# Patient Record
Sex: MALE | Race: ASIAN | NOT HISPANIC OR LATINO | Employment: FULL TIME | URBAN - METROPOLITAN AREA
[De-identification: names, ages, dates, MRNs, and addresses within clinical notes are randomized per-mention and may not be internally consistent; named-entity substitution may affect disease eponyms.]

---

## 2018-01-08 ENCOUNTER — OFFICE VISIT CONVERTED (OUTPATIENT)
Dept: CARDIOLOGY | Facility: CLINIC | Age: 63
End: 2018-01-08
Attending: INTERNAL MEDICINE

## 2018-07-31 ENCOUNTER — OFFICE VISIT CONVERTED (OUTPATIENT)
Dept: CARDIOLOGY | Facility: CLINIC | Age: 63
End: 2018-07-31
Attending: INTERNAL MEDICINE

## 2019-01-15 ENCOUNTER — HOSPITAL ENCOUNTER (OUTPATIENT)
Dept: LAB | Facility: HOSPITAL | Age: 64
Discharge: HOME | End: 2019-01-15
Attending: INTERNAL MEDICINE

## 2019-01-15 LAB
ALBUMIN SERPL-MCNC: 4.6 G/DL (ref 3.5–5)
ALBUMIN/GLOB SERPL: 1.4 {RATIO} (ref 1.4–2.6)
ALP SERPL-CCNC: 58 U/L (ref 56–155)
ALT SERPL-CCNC: 19 U/L (ref 10–40)
ANION GAP SERPL CALC-SCNC: 15 MMOL/L (ref 8–19)
AST SERPL-CCNC: 21 U/L (ref 15–50)
BILIRUB SERPL-MCNC: 0.66 MG/DL (ref 0.2–1.3)
BUN SERPL-MCNC: 17 MG/DL (ref 5–25)
BUN/CREAT SERPL: 17 {RATIO} (ref 6–20)
CALCIUM SERPL-MCNC: 9.3 MG/DL (ref 8.7–10.4)
CHLORIDE SERPL-SCNC: 99 MMOL/L (ref 99–111)
CHOLEST SERPL-MCNC: 132 MG/DL (ref 107–200)
CHOLEST/HDLC SERPL: 2.8 {RATIO} (ref 3–6)
CONV CO2: 29 MMOL/L (ref 22–32)
CONV TOTAL PROTEIN: 7.8 G/DL (ref 6.3–8.2)
CREAT UR-MCNC: 1.01 MG/DL (ref 0.7–1.2)
GFR SERPLBLD BASED ON 1.73 SQ M-ARVRAT: >60 ML/MIN/{1.73_M2}
GLOBULIN UR ELPH-MCNC: 3.2 G/DL (ref 2–3.5)
GLUCOSE SERPL-MCNC: 121 MG/DL (ref 70–99)
HDLC SERPL-MCNC: 47 MG/DL (ref 40–60)
LDLC SERPL CALC-MCNC: 72 MG/DL (ref 70–100)
OSMOLALITY SERPL CALC.SUM OF ELEC: 291 MOSM/KG (ref 273–304)
POTASSIUM SERPL-SCNC: 4.4 MMOL/L (ref 3.5–5.3)
SODIUM SERPL-SCNC: 139 MMOL/L (ref 135–147)
TRIGL SERPL-MCNC: 63 MG/DL (ref 40–150)
VLDLC SERPL-MCNC: 13 MG/DL (ref 5–37)

## 2019-02-04 ENCOUNTER — OFFICE VISIT CONVERTED (OUTPATIENT)
Dept: CARDIOLOGY | Facility: CLINIC | Age: 64
End: 2019-02-04
Attending: INTERNAL MEDICINE

## 2019-07-30 ENCOUNTER — HOSPITAL ENCOUNTER (OUTPATIENT)
Dept: LAB | Facility: HOSPITAL | Age: 64
Discharge: HOME | End: 2019-07-30
Attending: INTERNAL MEDICINE

## 2019-07-30 LAB
ALBUMIN SERPL-MCNC: 4.3 G/DL (ref 3.5–5)
ALBUMIN/GLOB SERPL: 1.5 {RATIO} (ref 1.4–2.6)
ALP SERPL-CCNC: 58 U/L (ref 56–155)
ALT SERPL-CCNC: 23 U/L (ref 10–40)
ANION GAP SERPL CALC-SCNC: 18 MMOL/L (ref 8–19)
AST SERPL-CCNC: 26 U/L (ref 15–50)
BILIRUB SERPL-MCNC: 0.62 MG/DL (ref 0.2–1.3)
BUN SERPL-MCNC: 16 MG/DL (ref 5–25)
BUN/CREAT SERPL: 16 {RATIO} (ref 6–20)
CALCIUM SERPL-MCNC: 9.4 MG/DL (ref 8.7–10.4)
CHLORIDE SERPL-SCNC: 103 MMOL/L (ref 99–111)
CHOLEST SERPL-MCNC: 109 MG/DL (ref 107–200)
CHOLEST/HDLC SERPL: 2.4 {RATIO} (ref 3–6)
CONV CO2: 26 MMOL/L (ref 22–32)
CONV TOTAL PROTEIN: 7.1 G/DL (ref 6.3–8.2)
CREAT UR-MCNC: 0.97 MG/DL (ref 0.7–1.2)
GFR SERPLBLD BASED ON 1.73 SQ M-ARVRAT: >60 ML/MIN/{1.73_M2}
GLOBULIN UR ELPH-MCNC: 2.8 G/DL (ref 2–3.5)
GLUCOSE SERPL-MCNC: 121 MG/DL (ref 70–99)
HDLC SERPL-MCNC: 45 MG/DL (ref 40–60)
LDLC SERPL CALC-MCNC: 51 MG/DL (ref 70–100)
OSMOLALITY SERPL CALC.SUM OF ELEC: 298 MOSM/KG (ref 273–304)
POTASSIUM SERPL-SCNC: 4.4 MMOL/L (ref 3.5–5.3)
SODIUM SERPL-SCNC: 143 MMOL/L (ref 135–147)
TRIGL SERPL-MCNC: 67 MG/DL (ref 40–150)
VLDLC SERPL-MCNC: 13 MG/DL (ref 5–37)

## 2019-08-05 ENCOUNTER — OFFICE VISIT CONVERTED (OUTPATIENT)
Dept: CARDIOLOGY | Facility: CLINIC | Age: 64
End: 2019-08-05
Attending: INTERNAL MEDICINE

## 2020-01-29 ENCOUNTER — HOSPITAL ENCOUNTER (OUTPATIENT)
Dept: LAB | Facility: HOSPITAL | Age: 65
Discharge: HOME | End: 2020-01-29
Attending: INTERNAL MEDICINE

## 2020-01-29 LAB
ALBUMIN SERPL-MCNC: 4.7 G/DL (ref 3.5–5)
ALBUMIN/GLOB SERPL: 1.6 {RATIO} (ref 1.4–2.6)
ALP SERPL-CCNC: 50 U/L (ref 56–155)
ALT SERPL-CCNC: 19 U/L (ref 10–40)
ANION GAP SERPL CALC-SCNC: 23 MMOL/L (ref 8–19)
AST SERPL-CCNC: 21 U/L (ref 15–50)
BILIRUB SERPL-MCNC: 0.67 MG/DL (ref 0.2–1.3)
BUN SERPL-MCNC: 18 MG/DL (ref 5–25)
BUN/CREAT SERPL: 19 {RATIO} (ref 6–20)
CALCIUM SERPL-MCNC: 9.7 MG/DL (ref 8.7–10.4)
CHLORIDE SERPL-SCNC: 102 MMOL/L (ref 99–111)
CHOLEST SERPL-MCNC: 137 MG/DL (ref 107–200)
CHOLEST/HDLC SERPL: 2.9 {RATIO} (ref 3–6)
CONV CO2: 23 MMOL/L (ref 22–32)
CONV TOTAL PROTEIN: 7.7 G/DL (ref 6.3–8.2)
CREAT UR-MCNC: 0.96 MG/DL (ref 0.7–1.2)
GFR SERPLBLD BASED ON 1.73 SQ M-ARVRAT: >60 ML/MIN/{1.73_M2}
GLOBULIN UR ELPH-MCNC: 3 G/DL (ref 2–3.5)
GLUCOSE SERPL-MCNC: 122 MG/DL (ref 70–99)
HDLC SERPL-MCNC: 48 MG/DL (ref 40–60)
LDLC SERPL CALC-MCNC: 77 MG/DL (ref 70–100)
OSMOLALITY SERPL CALC.SUM OF ELEC: 299 MOSM/KG (ref 273–304)
POTASSIUM SERPL-SCNC: 4.7 MMOL/L (ref 3.5–5.3)
SODIUM SERPL-SCNC: 143 MMOL/L (ref 135–147)
TRIGL SERPL-MCNC: 62 MG/DL (ref 40–150)
VLDLC SERPL-MCNC: 12 MG/DL (ref 5–37)

## 2020-02-10 ENCOUNTER — CONVERSION ENCOUNTER (OUTPATIENT)
Dept: CARDIOLOGY | Facility: CLINIC | Age: 65
End: 2020-02-10

## 2020-02-10 ENCOUNTER — OFFICE VISIT CONVERTED (OUTPATIENT)
Dept: CARDIOLOGY | Facility: CLINIC | Age: 65
End: 2020-02-10
Attending: INTERNAL MEDICINE

## 2020-08-10 ENCOUNTER — CONVERSION ENCOUNTER (OUTPATIENT)
Dept: CARDIOLOGY | Facility: CLINIC | Age: 65
End: 2020-08-10

## 2020-08-10 ENCOUNTER — OFFICE VISIT CONVERTED (OUTPATIENT)
Dept: CARDIOLOGY | Facility: CLINIC | Age: 65
End: 2020-08-10
Attending: INTERNAL MEDICINE

## 2020-08-31 ENCOUNTER — HOSPITAL ENCOUNTER (OUTPATIENT)
Dept: LAB | Facility: HOSPITAL | Age: 65
Discharge: HOME | End: 2020-08-31
Attending: INTERNAL MEDICINE

## 2020-08-31 LAB
ALBUMIN SERPL-MCNC: 4.4 G/DL (ref 3.5–5)
ALBUMIN/GLOB SERPL: 1.7 {RATIO} (ref 1.4–2.6)
ALP SERPL-CCNC: 58 U/L (ref 56–155)
ALT SERPL-CCNC: 20 U/L (ref 10–40)
ANION GAP SERPL CALC-SCNC: 17 MMOL/L (ref 8–19)
AST SERPL-CCNC: 28 U/L (ref 15–50)
BILIRUB SERPL-MCNC: 0.76 MG/DL (ref 0.2–1.3)
BUN SERPL-MCNC: 21 MG/DL (ref 5–25)
BUN/CREAT SERPL: 18 {RATIO} (ref 6–20)
CALCIUM SERPL-MCNC: 9.9 MG/DL (ref 8.7–10.4)
CHLORIDE SERPL-SCNC: 103 MMOL/L (ref 99–111)
CHOLEST SERPL-MCNC: 147 MG/DL (ref 107–200)
CHOLEST/HDLC SERPL: 3.2 {RATIO} (ref 3–6)
CONV CO2: 25 MMOL/L (ref 22–32)
CONV TOTAL PROTEIN: 7 G/DL (ref 6.3–8.2)
CREAT UR-MCNC: 1.14 MG/DL (ref 0.7–1.2)
GFR SERPLBLD BASED ON 1.73 SQ M-ARVRAT: >60 ML/MIN/{1.73_M2}
GLOBULIN UR ELPH-MCNC: 2.6 G/DL (ref 2–3.5)
GLUCOSE SERPL-MCNC: 124 MG/DL (ref 70–99)
HDLC SERPL-MCNC: 46 MG/DL (ref 40–60)
LDLC SERPL CALC-MCNC: 84 MG/DL (ref 70–100)
OSMOLALITY SERPL CALC.SUM OF ELEC: 294 MOSM/KG (ref 273–304)
POTASSIUM SERPL-SCNC: 4.9 MMOL/L (ref 3.5–5.3)
SODIUM SERPL-SCNC: 140 MMOL/L (ref 135–147)
TRIGL SERPL-MCNC: 84 MG/DL (ref 40–150)
VLDLC SERPL-MCNC: 17 MG/DL (ref 5–37)

## 2020-12-02 ENCOUNTER — HOSPITAL ENCOUNTER (OUTPATIENT)
Dept: LAB | Facility: HOSPITAL | Age: 65
Discharge: HOME | End: 2020-12-02
Attending: NURSE PRACTITIONER

## 2020-12-02 LAB
ALBUMIN SERPL-MCNC: 4.3 G/DL (ref 3.5–5)
ALBUMIN/GLOB SERPL: 1.6 {RATIO} (ref 1.4–2.6)
ALP SERPL-CCNC: 61 U/L (ref 56–155)
ALT SERPL-CCNC: 23 U/L (ref 10–40)
ANION GAP SERPL CALC-SCNC: 14 MMOL/L (ref 8–19)
AST SERPL-CCNC: 25 U/L (ref 15–50)
BILIRUB SERPL-MCNC: 0.69 MG/DL (ref 0.2–1.3)
BUN SERPL-MCNC: 19 MG/DL (ref 5–25)
BUN/CREAT SERPL: 18 {RATIO} (ref 6–20)
CALCIUM SERPL-MCNC: 9.3 MG/DL (ref 8.7–10.4)
CHLORIDE SERPL-SCNC: 103 MMOL/L (ref 99–111)
CHOLEST SERPL-MCNC: 119 MG/DL (ref 107–200)
CHOLEST/HDLC SERPL: 2.5 {RATIO} (ref 3–6)
CONV CO2: 26 MMOL/L (ref 22–32)
CONV TOTAL PROTEIN: 7 G/DL (ref 6.3–8.2)
CREAT UR-MCNC: 1.05 MG/DL (ref 0.7–1.2)
GFR SERPLBLD BASED ON 1.73 SQ M-ARVRAT: >60 ML/MIN/{1.73_M2}
GLOBULIN UR ELPH-MCNC: 2.7 G/DL (ref 2–3.5)
GLUCOSE SERPL-MCNC: 117 MG/DL (ref 70–99)
HDLC SERPL-MCNC: 47 MG/DL (ref 40–60)
LDLC SERPL CALC-MCNC: 57 MG/DL (ref 70–100)
OSMOLALITY SERPL CALC.SUM OF ELEC: 289 MOSM/KG (ref 273–304)
POTASSIUM SERPL-SCNC: 4.9 MMOL/L (ref 3.5–5.3)
SODIUM SERPL-SCNC: 138 MMOL/L (ref 135–147)
TRIGL SERPL-MCNC: 73 MG/DL (ref 40–150)
VLDLC SERPL-MCNC: 15 MG/DL (ref 5–37)

## 2021-05-10 NOTE — PROCEDURES
"   Procedure Note      Patient Name: Myles Gann   Patient ID: [id number]   Sex: Male   YOB: 1955    Primary Care Provider: Raina OBANDO   Referring Provider: Raina OBANDO    Visit Date: August 10, 2020    Provider: Everett Gallagher MD   Location: [facility]   Location Address: [address] [place] [address], [address]   [place]  [id number]   Location Phone: [phone]          FINAL REPORT   TRANSTHORACIC ECHOCARDIOGRAM REPORT    Diagnosis: Coronary artery disease.   Height: 5'8\" Weight: 192 B/P: 144/76 BSA: 2.0   Tech: BNS   MEASUREMENTS:  RVID (Diastole) : RVID. (NORMAL: 0.7 to 2.4 cm max)   LVID (Systole): 2.7 cm (Diastole): 4.8 cm . (NORMAL: 3.7 - 5.4 cm)   Posterior Wall Thickness (Diastole): 0.8 cm. (NORMAL: 0.8 - 1.1 cm)   Septal Thickness (Diastole): 1.0 cm. (NORMAL: 0.7 - 1.2 cm)   LAID (Systole): 3.3 cm. (NORMAL: 1.9 - 3.8 cm)   Aortic Root Diameter (Diastole): 3.1 cm. (NORMAL: 2.0 - 3.7 cm)   COMMENTS:  The patient underwent 2-D, M-Mode, and Doppler examination, including pulse-wave, continuous-wave, and color-flow Doppler analysis; the study is technically adequate. The following findings were noted:   FINDINGS:  MITRAL VALVE: Normal in appearance, opens well. No evidence of mitral valve prolapse. No mitral stenosis. There is mild mitral regurgitation.   AORTIC VALVE: Normal trileaflet aortic valve, opens well. No evidence of aortic stenosis or regurgitation on Doppler examination.   TRICUSPID VALVE: Normal in appearance, opens well. Trace tricuspid regurgitation. Normal pulmonary artery systolic pressure.   PULMONIC VALVE: Grossly normal.   AORTIC ROOT: Normal in size with adequate motion.   LEFT ATRIUM: Mild left atrial enlargement. No intracavitary masses or clots seen. LA volume index is 38 mL/m2.   LEFT VENTRICLE: The left ventricular chamber size is normal. The left ventricular wall thickness is normal. Left ventricular systolic function is normal. " Estimated LV ejection fraction is 55%. There are no regional wall motion abnormalities.   RIGHT VENTRICLE: Normal size and function.   RIGHT ATRIUM: Normal in size.   PERICARDIUM: No evidence of pericardial effusion.   INFERIOR VENA CAVA: Measures 1.7 cm in diameter and there is more than 50% collapse during inspiration.   DOPPLER: E/A ratio is 1.8. DT= 237 msec. IVRT is 84 msec. E/e' is 9.   Faxed: 08/10/2020      CONCLUSION:  1.  Normal left ventricular systolic function with estimated LV ejection fraction of 55%.   2.  Mild mitral regurgitation.       MD ERROL Andrade/jose    This note was transcribed by Kelsea Brown.  jose/errol  The above service was transcribed by Kelsea Brown, and I attest to the accuracy of the note.  TORINV                 Electronically Signed by: Марина Brown-, Other -Author on August 10, 2020 02:32:59 PM  Electronically Co-signed by: Everett Gallagher MD -Reviewer on August 11, 2020 10:04:31 AM

## 2021-05-13 NOTE — PROGRESS NOTES
Progress Note      Patient Name: Myles Gann   Patient ID: [id number]   Sex: Male   YOB: 1955    Primary Care Provider: Raina OBANDO   Referring Provider: Raina OBANDO    Visit Date: August 10, 2020    Provider: Everett Gallagher MD   Location: [facility]   Location Address: [address] [place] [address], [facility]   [place]  [id number]   Location Phone: [phone]          Chief Complaint  · Follow-up visit for coronary artery disease, hyperlipidemia.      History Of Present Illness  REFERRING CARE PROVIDER: Raina OBANDO   Myles Gann is a 65-year-old male with premature coronary artery disease with previous angioplasty, hypertension, and hyperlipidemia who is here for a routine six-month follow-up visit. He denies having any chest pain, shortness of breath, or palpitations. Overall, he is feeling fine.   PAST MEDICAL HISTORY: (1) Premature coronary artery disease. Cardiac catheterization on 11/08/2004 at [facility] in [place], [place], with 90% stenosis involving the proximal left anterior descending artery in the setting of acute coronary syndrome. He underwent 3.0 Cypher stent placement. Other arteries were normal at that point. (2) Preserved left ventricular function and last echocardiogram in 2008 showed LV ejection fraction of 60%. (3) Hypertension. (4) Hyperlipidemia. (5) Negative for coronary artery disease.   PSYCHOSOCIAL HISTORY: No history of mood change or depression. He drinks a moderate amount of alcohol. He does not use tobacco.   CURRENT MEDICATIONS: include Lisinopril 20 mg daily; Metoprolol  mg daily; aspirin 81 mg daily. The dosage and frequency of the medications were reviewed with the patient.       Review of Systems  · Cardiovascular  o Denies  o : palpitations (fast, fluttering, or skipping beats), swelling (feet, ankles, hands), shortness of breath while walking or lying flat, chest pain or angina pectoris  "  · Respiratory  o Denies  o : chronic or frequent cough, asthma or wheezing      Vitals  Date Time BP Position Site L\R Cuff Size HR RR TEMP (F) WT  HT  BMI kg/m2 BSA m2 O2 Sat HC       08/10/2020 10:30 /76 Sitting    58 - R   185lbs 16oz 5'  8\" 28.28 2.01           Physical Examination  · Respiratory  o Auscultation of Lungs  o : Clear to auscultation bilaterally. No crackles or rhonchi.  · Cardiovascular  o Heart  o : S1, S2 is normally heard. No S3. No murmur, rubs, or gallops.  · Gastrointestinal  o Abdominal Examination  o : Soft, nontender, nondistended. No free fluid. Bowel sounds heard in all four quadrants.  · Extremities  o Extremities  o : Warm and well perfused. No pitting pedal edema. Distal pulses present.  · Labs  o Labs  o : No recent labs available for review.      Echocardiogram done in the office today showed LV ejection fraction of 55%, and there is mild mitral regurgitation.           Assessment     ASSESSMENT AND PLAN:  1.  Coronary artery disease.  Remote angioplasty, stable with no angina.  Preserved LV function on today's        echocardiogram.  Continue aspirin, statin, and beta-blocker at the current dose.   2.  Hypertension, well controlled.  Continue current regimen.   3.  Hyperlipidemia.  Will check lipid panel now and adjust the dose as needed.  Continue Crestor for now.   4.  Follow up in six months.      MD ERROL Andrade/jose    This note was transcribed by Kelsea Brown.  jose/errol  The above service was transcribed by Kelsea Brown, and I attest to the accuracy of the note.  ERROL             Electronically Signed by: Марина Brown-, Other -Author on August 10, 2020 02:43:47 PM  Electronically Co-signed by: Everett Gallagher MD -Reviewer on August 11, 2020 10:04:23 AM  "

## 2021-05-15 VITALS
DIASTOLIC BLOOD PRESSURE: 76 MMHG | SYSTOLIC BLOOD PRESSURE: 122 MMHG | WEIGHT: 186 LBS | HEIGHT: 68 IN | BODY MASS INDEX: 28.19 KG/M2 | HEART RATE: 58 BPM

## 2021-05-15 VITALS
HEART RATE: 60 BPM | SYSTOLIC BLOOD PRESSURE: 140 MMHG | DIASTOLIC BLOOD PRESSURE: 76 MMHG | BODY MASS INDEX: 28.34 KG/M2 | HEIGHT: 68 IN | WEIGHT: 187 LBS

## 2021-05-15 VITALS
HEIGHT: 68 IN | WEIGHT: 189 LBS | DIASTOLIC BLOOD PRESSURE: 80 MMHG | SYSTOLIC BLOOD PRESSURE: 146 MMHG | HEART RATE: 60 BPM | BODY MASS INDEX: 28.64 KG/M2

## 2021-05-15 VITALS
HEIGHT: 68 IN | HEART RATE: 64 BPM | WEIGHT: 192 LBS | DIASTOLIC BLOOD PRESSURE: 76 MMHG | SYSTOLIC BLOOD PRESSURE: 144 MMHG | BODY MASS INDEX: 29.1 KG/M2

## 2021-05-16 VITALS
HEIGHT: 68 IN | SYSTOLIC BLOOD PRESSURE: 130 MMHG | WEIGHT: 191 LBS | HEART RATE: 78 BPM | BODY MASS INDEX: 28.95 KG/M2 | DIASTOLIC BLOOD PRESSURE: 86 MMHG

## 2021-05-16 VITALS
SYSTOLIC BLOOD PRESSURE: 138 MMHG | WEIGHT: 188 LBS | HEART RATE: 70 BPM | BODY MASS INDEX: 28.49 KG/M2 | DIASTOLIC BLOOD PRESSURE: 80 MMHG | HEIGHT: 68 IN

## 2021-07-12 RX ORDER — METOPROLOL SUCCINATE 100 MG/1
TABLET, EXTENDED RELEASE ORAL
Qty: 90 TABLET | Refills: 0 | Status: SHIPPED | OUTPATIENT
Start: 2021-07-12 | End: 2021-10-11 | Stop reason: SDUPTHER

## 2021-07-12 RX ORDER — LISINOPRIL 20 MG/1
TABLET ORAL
Qty: 90 TABLET | Refills: 0 | Status: SHIPPED | OUTPATIENT
Start: 2021-07-12 | End: 2021-10-11 | Stop reason: SDUPTHER

## 2021-07-18 PROBLEM — I10 HYPERTENSION: Status: ACTIVE | Noted: 2021-07-18

## 2021-07-18 PROBLEM — I21.9 HEART ATTACK: Status: ACTIVE | Noted: 2021-07-18

## 2021-07-18 PROBLEM — E78.5 HYPERLIPIDEMIA: Status: ACTIVE | Noted: 2021-07-18

## 2021-08-31 ENCOUNTER — PREP FOR SURGERY (OUTPATIENT)
Dept: OTHER | Facility: HOSPITAL | Age: 66
End: 2021-08-31

## 2021-08-31 ENCOUNTER — TELEPHONE (OUTPATIENT)
Dept: UROLOGY | Facility: CLINIC | Age: 66
End: 2021-08-31

## 2021-08-31 ENCOUNTER — OFFICE VISIT (OUTPATIENT)
Dept: SURGERY | Facility: CLINIC | Age: 66
End: 2021-08-31

## 2021-08-31 VITALS
WEIGHT: 191.4 LBS | HEIGHT: 68 IN | BODY MASS INDEX: 29.01 KG/M2 | OXYGEN SATURATION: 94 % | RESPIRATION RATE: 18 BRPM | HEART RATE: 73 BPM

## 2021-08-31 DIAGNOSIS — Z12.11 SCREENING FOR MALIGNANT NEOPLASM OF COLON: Primary | ICD-10-CM

## 2021-08-31 PROCEDURE — S0285 CNSLT BEFORE SCREEN COLONOSC: HCPCS | Performed by: NURSE PRACTITIONER

## 2021-08-31 RX ORDER — METOPROLOL SUCCINATE 100 MG/1
100 TABLET, EXTENDED RELEASE ORAL DAILY
COMMUNITY
End: 2021-10-11 | Stop reason: SDUPTHER

## 2021-08-31 RX ORDER — SODIUM CHLORIDE 0.9 % (FLUSH) 0.9 %
10 SYRINGE (ML) INJECTION AS NEEDED
Status: CANCELLED | OUTPATIENT
Start: 2021-08-31

## 2021-08-31 RX ORDER — ROSUVASTATIN CALCIUM 40 MG/1
TABLET, COATED ORAL
COMMUNITY
Start: 2021-07-15 | End: 2021-10-11 | Stop reason: SDUPTHER

## 2021-08-31 RX ORDER — PANTOPRAZOLE SODIUM 40 MG/1
40 TABLET, DELAYED RELEASE ORAL AS NEEDED
COMMUNITY
Start: 2021-07-06

## 2021-08-31 RX ORDER — LISINOPRIL 20 MG/1
20 TABLET ORAL DAILY
COMMUNITY
End: 2021-10-11 | Stop reason: SDUPTHER

## 2021-08-31 RX ORDER — SODIUM CHLORIDE 0.9 % (FLUSH) 0.9 %
3 SYRINGE (ML) INJECTION EVERY 12 HOURS SCHEDULED
Status: CANCELLED | OUTPATIENT
Start: 2021-08-31

## 2021-08-31 RX ORDER — ATORVASTATIN CALCIUM 40 MG/1
40 TABLET, FILM COATED ORAL DAILY
COMMUNITY
End: 2021-10-11 | Stop reason: ALTCHOICE

## 2021-08-31 RX ORDER — ASPIRIN 325 MG
325 TABLET ORAL
COMMUNITY

## 2021-08-31 RX ORDER — ERGOCALCIFEROL 1.25 MG/1
50000 CAPSULE ORAL
COMMUNITY
Start: 2021-07-06

## 2021-08-31 NOTE — PROGRESS NOTES
Chief Complaint:   Establish Care and Colonoscopy (Consult)    Subjective      Time for colonoscopy         History of Present Illness  Myles Gann is a 66 y.o. male presents to [facility] for colonoscopy consultation.    Patient denies any abdominal pain, change in bowel habit, rectal bleeding.    Denies any family history of colorectal cancer.    Patient reports that he had MI with stent placement several years ago and takes  mg daily.  We will have the patient hold this 2 days prior to the procedure.      10/08: colonoscopy ([facility]): small hemorrhoids.       Objective     Past Medical History:   Diagnosis Date   • Heart attack (CMS/HCC)    • Hyperlipidemia    • Hypertension        Past Surgical History:   Procedure Laterality Date   • CARDIAC SURGERY     • OTHER SURGICAL HISTORY      Stent in heart/ at 49 years old   • THROAT SURGERY           Current Outpatient Medications:   •  aspirin 325 MG tablet, Take 325 mg by mouth., Disp: , Rfl:   •  atorvastatin (LIPITOR) 40 MG tablet, Take 40 mg by mouth Daily., Disp: , Rfl:   •  lisinopril (PRINIVIL,ZESTRIL) 20 MG tablet, TAKE 1 TABLET BY MOUTH EVERY DAY, Disp: 90 tablet, Rfl: 0  •  metoprolol succinate XL (TOPROL-XL) 100 MG 24 hr tablet, TAKE 1 TABLET BY MOUTH EVERY DAY, Disp: 90 tablet, Rfl: 0  •  vitamin D (ERGOCALCIFEROL) 1.25 MG (68978 UT) capsule capsule, Take 50,000 Units by mouth Every 7 (Seven) Days.  , Disp: , Rfl:   •  lisinopril (PRINIVIL,ZESTRIL) 20 MG tablet, Take 20 mg by mouth Daily., Disp: , Rfl:   •  metoprolol succinate XL (TOPROL-XL) 100 MG 24 hr tablet, Take 100 mg by mouth Daily., Disp: , Rfl:   •  pantoprazole (PROTONIX) 40 MG EC tablet, Take 40 mg by mouth Daily., Disp: , Rfl:   •  rosuvastatin (CRESTOR) 40 MG tablet, TAKE 1 TABLET BY MOUTH BY MOUTH AT BEDTIME, Disp: , Rfl:     No Known Allergies     Family History   Problem Relation Age of Onset   • Diabetes Mother    • Diabetes Brother    • Heart  "disease Brother        Social History     Socioeconomic History   • Marital status:      Spouse name: Not on file   • Number of children: Not on file   • Years of education: Not on file   • Highest education level: Not on file   Tobacco Use   • Smoking status: Never Smoker   • Smokeless tobacco: Never Used   Vaping Use   • Vaping Use: Never used   Substance and Sexual Activity   • Alcohol use: Yes     Comment: Occasionally   • Drug use: Never   • Sexual activity: Defer       Review of Systems     Vital Signs:   Pulse 73   Resp 18   Ht 172.7 cm (68\")   Wt 86.8 kg (191 lb 6.4 oz)   SpO2 94% Comment: room air  BMI 29.10 kg/m²      Physical Exam  Constitutional:       Appearance: Normal appearance.   Cardiovascular:      Rate and Rhythm: Normal rate.   Pulmonary:      Effort: Pulmonary effort is normal.   Abdominal:      General: Abdomen is flat.      Palpations: Abdomen is soft.   Skin:     General: Skin is warm and dry.   Neurological:      General: No focal deficit present.      Mental Status: He is alert and oriented to person, place, and time.   Psychiatric:         Mood and Affect: Mood normal.         Behavior: Behavior normal.          Result Review :              []  Laboratory  []  Radiology  []  Pathology  []  Microbiology  []  EKG/Telemetry   []  Cardiology/Vascular   [x]  Old records       Assessment and Plan    Diagnoses and all orders for this visit:    1. Screening for malignant neoplasm of colon (Primary)        Follow Up   Return for Schedule colonoscopy with Dr. Villegas on 10/13/21 at [facility].     Hospital call with arrival time    Patient was given instructions and counseling regarding his condition or for health maintenance advice. Please see specific information pulled into the AVS if appropriate.           "

## 2021-09-08 NOTE — TELEPHONE ENCOUNTER
Colonoscopy is 10/13/21. Do you want me to go ahead and give them a note or wait until it gets closer, to see if they can/do actually have the procedure?

## 2021-09-08 NOTE — TELEPHONE ENCOUNTER
Being we are doing elective procedures right now. We need to wait until it get closer to the date. He could be re-scheduled. Please explain. We will still issue a note but we need to see appropriate date.

## 2021-09-09 NOTE — TELEPHONE ENCOUNTER
Called patient's wife and told her we would wait until it gets closer, given the current circumstances with elective procedures just to make sure that appt date remains the same. She voiced understanding.

## 2021-10-01 ENCOUNTER — TELEPHONE (OUTPATIENT)
Dept: SURGERY | Facility: CLINIC | Age: 66
End: 2021-10-01

## 2021-10-01 NOTE — TELEPHONE ENCOUNTER
I called pt wife back, let them know colonoscopy is cx for 10-13-21 due to covid. I will call them back when I am able to r/s.

## 2021-10-11 ENCOUNTER — OFFICE VISIT (OUTPATIENT)
Dept: CARDIOLOGY | Facility: CLINIC | Age: 66
End: 2021-10-11

## 2021-10-11 VITALS
BODY MASS INDEX: 28.49 KG/M2 | WEIGHT: 188 LBS | DIASTOLIC BLOOD PRESSURE: 68 MMHG | HEIGHT: 68 IN | SYSTOLIC BLOOD PRESSURE: 140 MMHG | HEART RATE: 80 BPM

## 2021-10-11 DIAGNOSIS — I25.10 CORONARY ARTERY DISEASE INVOLVING NATIVE CORONARY ARTERY OF NATIVE HEART WITHOUT ANGINA PECTORIS: Primary | ICD-10-CM

## 2021-10-11 DIAGNOSIS — I10 ESSENTIAL HYPERTENSION: ICD-10-CM

## 2021-10-11 DIAGNOSIS — E78.2 MIXED HYPERLIPIDEMIA: ICD-10-CM

## 2021-10-11 PROCEDURE — 99213 OFFICE O/P EST LOW 20 MIN: CPT | Performed by: INTERNAL MEDICINE

## 2021-10-11 RX ORDER — LISINOPRIL 20 MG/1
20 TABLET ORAL DAILY
Qty: 90 TABLET | Refills: 3 | Status: SHIPPED | OUTPATIENT
Start: 2021-10-11 | End: 2022-10-14

## 2021-10-11 RX ORDER — ROSUVASTATIN CALCIUM 40 MG/1
40 TABLET, COATED ORAL NIGHTLY
Qty: 90 TABLET | Refills: 3 | Status: SHIPPED | OUTPATIENT
Start: 2021-10-11 | End: 2022-10-14

## 2021-10-11 RX ORDER — METOPROLOL SUCCINATE 100 MG/1
100 TABLET, EXTENDED RELEASE ORAL DAILY
Qty: 90 TABLET | Refills: 3 | Status: SHIPPED | OUTPATIENT
Start: 2021-10-11 | End: 2022-10-14

## 2021-10-11 NOTE — PROGRESS NOTES
CARDIOLOGY FOLLOW-UP PROGRESS NOTE        Chief Complaint  Follow-up, Hypertension, Hyperlipidemia, and Coronary Artery Disease    Subjective            Myles Gann presents to [facility] CARDIOLOGY  History of Present Illness    This is a 66-year-old male with premature coronary artery disease, previous angioplasty, hypertension hyperlipidemia who is here for annual follow-up visit.  He denies having any chest pain, shortness of breath, palpitations, dizziness.  Overall feeling fine.  Taking all the medications as prescribed.       Past History:    (1) Premature coronary artery disease. Cardiac catheterization on 11/08/2004 at [facility] in [place], [place], with 90% stenosis involving the proximal left anterior descending artery in the setting of acute coronary syndrome. He underwent 3.0 Cypher stent placement. Other arteries were normal at that point. (2) Preserved left ventricular function and last echocardiogram in 2008 showed LV ejection fraction of 60%. (3) Hypertension. (4) Hyperlipidemia. (5) Negative for coronary artery disease.     Medical History:  Past Medical History:   Diagnosis Date   • Heart attack (HCC)    • Hyperlipidemia    • Hypertension        Surgical History: has a past surgical history that includes Cardiac surgery; Throat surgery; and Other surgical history.     Family History: family history includes Diabetes in his brother and mother; Heart disease in his brother.     Social History: reports that he has never smoked. He has never used smokeless tobacco. He reports current alcohol use. He reports that he does not use drugs.    Allergies: Patient has no known allergies.    Current Outpatient Medications on File Prior to Visit   Medication Sig   • aspirin 325 MG tablet Take 325 mg by mouth.   • pantoprazole (PROTONIX) 40 MG EC tablet Take 40 mg by mouth As Needed.   • vitamin D (ERGOCALCIFEROL) 1.25 MG (38227 UT) capsule capsule Take 50,000 Units  "by mouth Every 7 (Seven) Days.     • [DISCONTINUED] lisinopril (PRINIVIL,ZESTRIL) 20 MG tablet TAKE 1 TABLET BY MOUTH EVERY DAY   • [DISCONTINUED] metoprolol succinate XL (TOPROL-XL) 100 MG 24 hr tablet TAKE 1 TABLET BY MOUTH EVERY DAY   • [DISCONTINUED] rosuvastatin (CRESTOR) 40 MG tablet TAKE 1 TABLET BY MOUTH BY MOUTH AT BEDTIME   • [DISCONTINUED] atorvastatin (LIPITOR) 40 MG tablet Take 40 mg by mouth Daily.   • [DISCONTINUED] lisinopril (PRINIVIL,ZESTRIL) 20 MG tablet Take 20 mg by mouth Daily.   • [DISCONTINUED] metoprolol succinate XL (TOPROL-XL) 100 MG 24 hr tablet Take 100 mg by mouth Daily.     No current facility-administered medications on file prior to visit.          Review of Systems   Respiratory: Negative for cough, shortness of breath and wheezing.    Cardiovascular: Negative for chest pain, palpitations and leg swelling.   Gastrointestinal: Negative for nausea and vomiting.   Neurological: Negative for dizziness and syncope.        Objective     /68   Pulse 80   Ht 172.7 cm (68\")   Wt 85.3 kg (188 lb)   BMI 28.59 kg/m²       Physical Exam    General : Alert, awake, no acute distress  Neck : Supple, no carotid bruit, no jugular venous distention  CVS : Regular rate and rhythm, no murmur, rubs or gallops  Lungs: Clear to auscultation bilaterally, no crackles or rhonchi  Abdomen: Soft, nontender, bowel sounds heard in all 4 quadrants  Extremities: Warm, well-perfused, no pedal edema    Result Review :     The following data was reviewed by: Everett Gallagher MD on 10/11/2021:    CMP    CMP 12/2/20   Glucose 117 (A)   BUN 19   Creatinine 1.05   Sodium 138   Potassium 4.9   Chloride 103   Calcium 9.3   Albumin 4.3   Total Bilirubin 0.69   Alkaline Phosphatase 61   AST (SGOT) 25   ALT (SGPT) 23   (A) Abnormal value                Lipid Panel    Lipid Panel 12/2/20   Total Cholesterol 119   Triglycerides 73   HDL Cholesterol 47   VLDL Cholesterol 15   LDL Cholesterol  57 (A)   (A) Abnormal " value       Comments are available for some flowsheets but are not being displayed.                Data reviewed: Cardiology studies        Echocardiogram done on 8/10/2020 showed    1. Normal left ventricular systolic function with estimated LV ejection fraction of 55%.   2. Mild mitral regurgitation.                 Assessment and Plan        Diagnoses and all orders for this visit:    1. Coronary artery disease involving native coronary artery of native heart without angina pectoris (Primary)  Assessment & Plan:  Remote angioplasty, currently stable with no angina.  LV function is preserved per echocardiogram done last year.  Continue aspirin, statin beta-blocker at the current dose    Orders:  -     metoprolol succinate XL (TOPROL-XL) 100 MG 24 hr tablet; Take 1 tablet by mouth Daily.  Dispense: 90 tablet; Refill: 3  -     rosuvastatin (CRESTOR) 40 MG tablet; Take 1 tablet by mouth Every Night.  Dispense: 90 tablet; Refill: 3  -     Lipid Panel; Future  -     Comprehensive Metabolic Panel; Future    2. Mixed hyperlipidemia  Assessment & Plan:  LDL at goal per most recent available labs.  Continue Crestor 40 mg at bedtime.  We will do repeat labs again next year.    Orders:  -     rosuvastatin (CRESTOR) 40 MG tablet; Take 1 tablet by mouth Every Night.  Dispense: 90 tablet; Refill: 3  -     Lipid Panel; Future  -     Comprehensive Metabolic Panel; Future    3. Essential hypertension  Assessment & Plan:  Blood pressure reasonably well controlled.  Continue current regimen.    Orders:  -     metoprolol succinate XL (TOPROL-XL) 100 MG 24 hr tablet; Take 1 tablet by mouth Daily.  Dispense: 90 tablet; Refill: 3  -     lisinopril (PRINIVIL,ZESTRIL) 20 MG tablet; Take 1 tablet by mouth Daily.  Dispense: 90 tablet; Refill: 3  -     Comprehensive Metabolic Panel; Future            Follow Up     Return in about 1 year (around 10/11/2022) for Recheck, Next scheduled follow up.    Patient was given instructions and  counseling regarding his condition or for health maintenance advice. Please see specific information pulled into the AVS if appropriate.

## 2021-10-11 NOTE — PROGRESS NOTES
Chief Complaint  No chief complaint on file.    Subjective     {Problem List  Visit Diagnosis   Encounters  Notes  Medications  Labs  Result Review Imaging  Media :23}       Myles Gann presents to [facility] CARDIOLOGY  History of Present Illness    Past Medical History:   Diagnosis Date   • Heart attack (CMS/HCC)    • Hyperlipidemia    • Hypertension        No Known Allergies     Past Surgical History:   Procedure Laterality Date   • CARDIAC SURGERY     • OTHER SURGICAL HISTORY      Stent in heart/ at 49 years old   • THROAT SURGERY          Social History     Tobacco Use   • Smoking status: Never Smoker   • Smokeless tobacco: Never Used   Vaping Use   • Vaping Use: Never used   Substance Use Topics   • Alcohol use: Yes     Comment: Occasionally   • Drug use: Never       Family History   Problem Relation Age of Onset   • Diabetes Mother    • Diabetes Brother    • Heart disease Brother         Prior to Admission medications    Medication Sig Start Date End Date Taking? Authorizing Provider   aspirin 325 MG tablet Take 325 mg by mouth.    Corinne Patel MD   atorvastatin (LIPITOR) 40 MG tablet Take 40 mg by mouth Daily.    Corinne Patel MD   lisinopril (PRINIVIL,ZESTRIL) 20 MG tablet TAKE 1 TABLET BY MOUTH EVERY DAY 7/12/21   Graciela Jensen APRN   lisinopril (PRINIVIL,ZESTRIL) 20 MG tablet Take 20 mg by mouth Daily.    Corinne Patel MD   metoprolol succinate XL (TOPROL-XL) 100 MG 24 hr tablet TAKE 1 TABLET BY MOUTH EVERY DAY 7/12/21   Graciela Jensen APRN   metoprolol succinate XL (TOPROL-XL) 100 MG 24 hr tablet Take 100 mg by mouth Daily.    Corinne Patel MD   pantoprazole (PROTONIX) 40 MG EC tablet Take 40 mg by mouth Daily. 7/6/21   Corinne Patel MD   rosuvastatin (CRESTOR) 40 MG tablet TAKE 1 TABLET BY MOUTH BY MOUTH AT BEDTIME 7/15/21   Corinne Patel MD   vitamin D (ERGOCALCIFEROL) 1.25 MG (48272 UT) capsule capsule Take 50,000  Units by mouth Every 7 (Seven) Days.   7/6/21   Provider, Corinne, MD        Review of Systems     Objective     There were no vitals taken for this visit.      Physical Exam      Result Review :{Labs  Result Review  Imaging  Med Tab  Media :23}     {The following data was reviewed by (Optional):19009}    {Ambulatory Labs (Optional):10005}    {Data reviewed (Optional):63392:::1}              Assessment and Plan {CC Problem List  Visit Diagnosis  ROS  Review (Popup)  Health Maintenance  Quality  BestPractice  Medications  SmartSets  SnapShot Encounters  Media :23}       There are no diagnoses linked to this encounter.        Follow Up {Instructions Charge Capture  Follow-up Communications :23}    No follow-ups on file.    Patient was given instructions and counseling regarding his condition or for health maintenance advice. Please see specific information pulled into the AVS if appropriate.

## 2021-10-11 NOTE — ASSESSMENT & PLAN NOTE
Remote angioplasty, currently stable with no angina.  LV function is preserved per echocardiogram done last year.  Continue aspirin, statin beta-blocker at the current dose

## 2021-10-11 NOTE — ASSESSMENT & PLAN NOTE
LDL at goal per most recent available labs.  Continue Crestor 40 mg at bedtime.  We will do repeat labs again next year.

## 2021-10-12 ENCOUNTER — FLU SHOT (OUTPATIENT)
Dept: VACCINE CLINIC | Facility: HOSPITAL | Age: 66
End: 2021-10-12

## 2021-10-12 DIAGNOSIS — Z23 NEED FOR INFLUENZA VACCINATION: Primary | ICD-10-CM

## 2022-01-05 ENCOUNTER — ANESTHESIA EVENT (OUTPATIENT)
Dept: GASTROENTEROLOGY | Facility: HOSPITAL | Age: 67
End: 2022-01-05

## 2022-01-05 ENCOUNTER — HOSPITAL ENCOUNTER (OUTPATIENT)
Facility: HOSPITAL | Age: 67
Setting detail: HOSPITAL OUTPATIENT SURGERY
Discharge: HOME | End: 2022-01-05
Attending: SURGERY | Admitting: SURGERY

## 2022-01-05 ENCOUNTER — ANESTHESIA (OUTPATIENT)
Dept: GASTROENTEROLOGY | Facility: HOSPITAL | Age: 67
End: 2022-01-05

## 2022-01-05 VITALS
HEART RATE: 71 BPM | OXYGEN SATURATION: 97 % | DIASTOLIC BLOOD PRESSURE: 73 MMHG | RESPIRATION RATE: 21 BRPM | HEIGHT: 70 IN | SYSTOLIC BLOOD PRESSURE: 104 MMHG | BODY MASS INDEX: 27.21 KG/M2 | WEIGHT: 190.04 LBS | TEMPERATURE: 97.2 F

## 2022-01-05 DIAGNOSIS — Z12.11 SCREENING FOR MALIGNANT NEOPLASM OF COLON: ICD-10-CM

## 2022-01-05 PROCEDURE — 25010000002 PROPOFOL 10 MG/ML EMULSION: Performed by: NURSE ANESTHETIST, CERTIFIED REGISTERED

## 2022-01-05 RX ORDER — LIDOCAINE HYDROCHLORIDE 20 MG/ML
INJECTION, SOLUTION INFILTRATION; PERINEURAL AS NEEDED
Status: DISCONTINUED | OUTPATIENT
Start: 2022-01-05 | End: 2022-01-05 | Stop reason: SURG

## 2022-01-05 RX ORDER — SODIUM CHLORIDE 0.9 % (FLUSH) 0.9 %
3 SYRINGE (ML) INJECTION EVERY 12 HOURS SCHEDULED
Status: DISCONTINUED | OUTPATIENT
Start: 2022-01-05 | End: 2022-01-05 | Stop reason: HOSPADM

## 2022-01-05 RX ORDER — SODIUM CHLORIDE, SODIUM LACTATE, POTASSIUM CHLORIDE, CALCIUM CHLORIDE 600; 310; 30; 20 MG/100ML; MG/100ML; MG/100ML; MG/100ML
30 INJECTION, SOLUTION INTRAVENOUS CONTINUOUS
Status: DISCONTINUED | OUTPATIENT
Start: 2022-01-05 | End: 2022-01-05 | Stop reason: HOSPADM

## 2022-01-05 RX ORDER — SODIUM CHLORIDE 0.9 % (FLUSH) 0.9 %
10 SYRINGE (ML) INJECTION AS NEEDED
Status: DISCONTINUED | OUTPATIENT
Start: 2022-01-05 | End: 2022-01-05 | Stop reason: HOSPADM

## 2022-01-05 RX ADMIN — SODIUM CHLORIDE, POTASSIUM CHLORIDE, SODIUM LACTATE AND CALCIUM CHLORIDE: 600; 310; 30; 20 INJECTION, SOLUTION INTRAVENOUS at 09:35

## 2022-01-05 RX ADMIN — LIDOCAINE HYDROCHLORIDE 40 MG: 20 INJECTION, SOLUTION INFILTRATION; PERINEURAL at 09:38

## 2022-01-05 RX ADMIN — PROPOFOL 250 MCG/KG/MIN: 10 INJECTION, EMULSION INTRAVENOUS at 09:38

## 2022-01-05 NOTE — DISCHARGE INSTRUCTIONS
Colonoscopy, Adult, Care After  This sheet gives you information about how to care for yourself after your procedure. Your doctor may also give you more specific instructions. If you have problems or questions, call your doctor.  What can I expect after the procedure?  After the procedure, it is common to have:  · A small amount of blood in your poop (stool) for 24 hours.  · Some gas.  · Mild cramping or bloating in your belly (abdomen).  Follow these instructions at home:  Eating and drinking    · Drink enough fluid to keep your pee (urine) pale yellow.  · Follow instructions from your doctor about what you cannot eat or drink.  · Return to your normal diet as told by your doctor. Avoid heavy or fried foods that are hard to digest.    Activity  · Rest as told by your doctor.  · Do not sit for a long time without moving. Get up to take short walks every 1-2 hours. This is important. Ask for help if you feel weak or unsteady.  · Return to your normal activities as told by your doctor. Ask your doctor what activities are safe for you.  To help cramping and bloating:    · Try walking around.  · Put heat on your belly as told by your doctor. Use the heat source that your doctor recommends, such as a moist heat pack or a heating pad.  ? Put a towel between your skin and the heat source.  ? Leave the heat on for 20-30 minutes.  ? Remove the heat if your skin turns bright red. This is very important if you are unable to feel pain, heat, or cold. You may have a greater risk of getting burned.    General instructions  · If you were given a medicine to help you relax (sedative) during your procedure, it can affect you for many hours. Do not drive or use machinery until your doctor says that it is safe.  · For the first 24 hours after the procedure:  ? Do not sign important documents.  ? Do not drink alcohol.  ? Do your daily activities more slowly than normal.  ? Eat foods that are soft and easy to digest.  · Take  over-the-counter or prescription medicines only as told by your doctor.  · Keep all follow-up visits as told by your doctor. This is important.  Contact a doctor if:  · You have blood in your poop 2-3 days after the procedure.  Get help right away if:  · You have more than a small amount of blood in your poop.  · You see large clumps of tissue (blood clots) in your poop.  · Your belly is swollen.  · You feel like you may vomit (nauseous).  · You vomit.  · You have a fever.  · You have belly pain that gets worse, and medicine does not help your pain.  Summary  · After the procedure, it is common to have a small amount of blood in your poop. You may also have mild cramping and bloating in your belly.  · If you were given a medicine to help you relax (sedative) during your procedure, it can affect you for many hours. Do not drive or use machinery until your doctor says that it is safe.  · Get help right away if you have a lot of blood in your poop, feel like you may vomit, have a fever, or have more belly pain.  This information is not intended to replace advice given to you by your health care provider. Make sure you discuss any questions you have with your health care provider.  Document Revised: 10/23/2020 Document Reviewed: 07/13/2020  [employer] Patient Education © 2021 [employer]    Dr. Villegas's Instructions       • Next colonoscopy in 10 years.    • Follow-up with your primary care clinician at your next scheduled appointment time.

## 2022-01-05 NOTE — H&P
Chief Complaint:   No chief complaint on file.    Subjective      Time for colonoscopy         History of Present Illness  Myles Gann is a 66 y.o. male presents to [facility] for colonoscopy consultation.    Patient denies any abdominal pain, change in bowel habit, rectal bleeding.    Denies any family history of colorectal cancer.    Patient reports that he had MI with stent placement several years ago and takes  mg daily.  We will have the patient hold this 2 days prior to the procedure.      10/08: colonoscopy ([facility]): small hemorrhoids.       Objective   No Known Allergies  Outpatient Medications Marked as Taking for the 1/5/22 encounter (Hospital Encounter)   Medication Sig Dispense Refill   • aspirin 325 MG tablet Take 325 mg by mouth.     • lisinopril (PRINIVIL,ZESTRIL) 20 MG tablet Take 1 tablet by mouth Daily. 90 tablet 3   • metoprolol succinate XL (TOPROL-XL) 100 MG 24 hr tablet Take 1 tablet by mouth Daily. 90 tablet 3   • pantoprazole (PROTONIX) 40 MG EC tablet Take 40 mg by mouth As Needed.     • rosuvastatin (CRESTOR) 40 MG tablet Take 1 tablet by mouth Every Night. 90 tablet 3   • vitamin D (ERGOCALCIFEROL) 1.25 MG (56105 UT) capsule capsule Take 50,000 Units by mouth Every 7 (Seven) Days.           Past Medical History:   Diagnosis Date   • Heart attack (HCC)    • Hyperlipidemia    • Hypertension        Past Surgical History:   Procedure Laterality Date   • CARDIAC SURGERY     • OTHER SURGICAL HISTORY      Stent in heart/ at 49 years old   • THROAT SURGERY         No current facility-administered medications for this encounter.    Current Outpatient Medications:   •  aspirin 325 MG tablet, Take 325 mg by mouth., Disp: , Rfl:   •  lisinopril (PRINIVIL,ZESTRIL) 20 MG tablet, Take 1 tablet by mouth Daily., Disp: 90 tablet, Rfl: 3  •  metoprolol succinate XL (TOPROL-XL) 100 MG 24 hr tablet, Take 1 tablet by mouth Daily., Disp: 90 tablet, Rfl: 3  •  pantoprazole  "(PROTONIX) 40 MG EC tablet, Take 40 mg by mouth As Needed., Disp: , Rfl:   •  rosuvastatin (CRESTOR) 40 MG tablet, Take 1 tablet by mouth Every Night., Disp: 90 tablet, Rfl: 3  •  vitamin D (ERGOCALCIFEROL) 1.25 MG (85573 UT) capsule capsule, Take 50,000 Units by mouth Every 7 (Seven) Days.  , Disp: , Rfl:     No Known Allergies     Family History   Problem Relation Age of Onset   • Diabetes Mother    • Diabetes Brother    • Heart disease Brother    • Malig Hyperthermia Neg Hx        Social History     Socioeconomic History   • Marital status:    Tobacco Use   • Smoking status: Never Smoker   • Smokeless tobacco: Never Used   Vaping Use   • Vaping Use: Never used   Substance and Sexual Activity   • Alcohol use: Yes     Comment: Occasionally   • Drug use: Never   • Sexual activity: Defer       Vital Signs:   Ht 177.8 cm (70\")   Wt 83.9 kg (185 lb)   BMI 26.54 kg/m²      Physical Exam  Constitutional:       Appearance: Normal appearance.   Cardiovascular:      Rate and Rhythm: Normal rate.   Pulmonary:      Effort: Pulmonary effort is normal.   Abdominal:      General: Abdomen is flat.      Palpations: Abdomen is soft.   Skin:     General: Skin is warm and dry.   Neurological:      General: No focal deficit present.      Mental Status: He is alert and oriented to person, place, and time.   Psychiatric:         Mood and Affect: Mood normal.         Behavior: Behavior normal.         Assessment  Screening colonoscopy    Plan  Colonoscopy    Risks and benefits discussed    Electronically signed by Panfilo Villegas MD, 01/05/22, 8:20 AM EST.        "

## 2022-01-05 NOTE — ANESTHESIA PREPROCEDURE EVALUATION
Anesthesia Evaluation     Patient summary reviewed and Nursing notes reviewed   no history of anesthetic complications:  NPO Solid Status: > 8 hours  NPO Liquid Status: > 2 hours           Airway   Mallampati: II  TM distance: >3 FB  Neck ROM: full  No difficulty expected  Dental      Pulmonary - negative pulmonary ROS and normal exam    breath sounds clear to auscultation  Cardiovascular - normal exam  Exercise tolerance: good (4-7 METS)    Rhythm: regular    (+) hypertension, past MI , CAD, hyperlipidemia,       Neuro/Psych- negative ROS  GI/Hepatic/Renal/Endo - negative ROS     Musculoskeletal (-) negative ROS    Abdominal    Substance History - negative use     OB/GYN negative ob/gyn ROS         Other - negative ROS                       Anesthesia Plan    ASA 3     general   (Total IV Anesthesia    Patient understands anesthesia not responsible for dental damage.  )  intravenous induction     Anesthetic plan, all risks, benefits, and alternatives have been provided, discussed and informed consent has been obtained with: patient.    Plan discussed with CRNA.

## 2022-01-05 NOTE — ANESTHESIA POSTPROCEDURE EVALUATION
Patient: Myles Gann    Procedure Summary     Date: 01/05/22 Room / Location: [facility] ENDOSCOPY 1 / [facility]    Anesthesia Start: 0935 Anesthesia Stop: 1001    Procedure: COLONOSCOPY with possible biopies. (N/A ) Diagnosis:       Screening for malignant neoplasm of colon      (Screening for malignant neoplasm of colon [Z12.11])    Surgeons: Panfilo Villegas MD Provider: Sulaiman Cabrera MD    Anesthesia Type: general ASA Status: 3          Anesthesia Type: general    Vitals  Vitals Value Taken Time   /66 01/05/22 1002   Temp 36.5 °C (97.7 °F) 01/05/22 0957   Pulse 74 01/05/22 1002   Resp 20 01/05/22 1002   SpO2 95 % 01/05/22 1002           Post Anesthesia Care and Evaluation    Patient location during evaluation: bedside  Patient participation: complete - patient participated  Level of consciousness: awake and alert  Pain management: adequate  Airway patency: patent  Anesthetic complications: No anesthetic complications  PONV Status: none  Cardiovascular status: acceptable  Respiratory status: acceptable  Hydration status: acceptable    Comments: An Anesthesiologist personally participated in the most demanding procedures (including induction and emergence if applicable) in the anesthesia plan, monitored the course of anesthesia administration at frequent intervals and remained physically present and available for immediate diagnosis and treatment of emergencies.

## 2022-04-11 ENCOUNTER — TELEPHONE (OUTPATIENT)
Dept: CARDIOLOGY | Facility: CLINIC | Age: 67
End: 2022-04-11

## 2022-04-11 DIAGNOSIS — E78.2 MIXED HYPERLIPIDEMIA: Primary | ICD-10-CM

## 2022-04-11 NOTE — TELEPHONE ENCOUNTER
Decrease rosuvastatin 40 mg half a tab a day.  Let me know if he still has body aches.  Check lipids in 3 months.  If we change to something else it is probably going to be a combination of drugs.

## 2022-04-11 NOTE — TELEPHONE ENCOUNTER
Pt called and stated she was having body and bone aches from the Rosuvastatin and would like to be put on something else.

## 2022-04-11 NOTE — ADDENDUM NOTE
Addended by: HALIMA ALEMAN JUNE on: 4/11/2022 12:48 PM     Modules accepted: Orders     16-Sep-2018 16:26

## 2022-07-18 ENCOUNTER — OFFICE VISIT (OUTPATIENT)
Dept: ORTHOPEDIC SURGERY | Facility: CLINIC | Age: 67
End: 2022-07-18

## 2022-07-18 VITALS — HEART RATE: 81 BPM | HEIGHT: 70 IN | WEIGHT: 190 LBS | OXYGEN SATURATION: 98 % | BODY MASS INDEX: 27.2 KG/M2

## 2022-07-18 DIAGNOSIS — M25.511 RIGHT SHOULDER PAIN, UNSPECIFIED CHRONICITY: Primary | ICD-10-CM

## 2022-07-18 PROCEDURE — 99203 OFFICE O/P NEW LOW 30 MIN: CPT | Performed by: ORTHOPAEDIC SURGERY

## 2022-07-18 PROCEDURE — 20610 DRAIN/INJ JOINT/BURSA W/O US: CPT | Performed by: ORTHOPAEDIC SURGERY

## 2022-07-18 RX ORDER — TRIAMCINOLONE ACETONIDE 40 MG/ML
40 INJECTION, SUSPENSION INTRA-ARTICULAR; INTRAMUSCULAR
Status: COMPLETED | OUTPATIENT
Start: 2022-07-18 | End: 2022-07-18

## 2022-07-18 RX ORDER — LIDOCAINE HYDROCHLORIDE 10 MG/ML
9 INJECTION, SOLUTION INFILTRATION; PERINEURAL
Status: COMPLETED | OUTPATIENT
Start: 2022-07-18 | End: 2022-07-18

## 2022-07-18 RX ADMIN — LIDOCAINE HYDROCHLORIDE 9 ML: 10 INJECTION, SOLUTION INFILTRATION; PERINEURAL at 15:55

## 2022-07-18 RX ADMIN — TRIAMCINOLONE ACETONIDE 40 MG: 40 INJECTION, SUSPENSION INTRA-ARTICULAR; INTRAMUSCULAR at 15:55

## 2022-07-18 NOTE — PROGRESS NOTES
"Chief Complaint  Initial Evaluation of the Right Shoulder     Subjective      Myles Gann presents to [facility] for an evaluation of right shoulder. He has posterior and lateral shoulder pain that radiates down the upper arm. He hasn't had any injury or trauma to the right shoulder. He reports pain when waking up in the morning. He feels like his shoulder is locked until he can loosen it up. Patient reports difficulty with heavy lifting at times. Pain has been ongoing for 6 months.     No Known Allergies     Social History     Socioeconomic History   • Marital status:    Tobacco Use   • Smoking status: Never Smoker   • Smokeless tobacco: Never Used   Vaping Use   • Vaping Use: Never used   Substance and Sexual Activity   • Alcohol use: Yes     Comment: Occasionally   • Drug use: Never   • Sexual activity: Defer        Review of Systems     Objective   Vital Signs:   Pulse 81   Ht 177.8 cm (70\")   Wt 86.2 kg (190 lb)   SpO2 98%   BMI 27.26 kg/m²       Physical Exam  Constitutional:       Appearance: Normal appearance. Patient is well-developed and normal weight.   HENT:      Head: Normocephalic.      Right Ear: Hearing and external ear normal.      Left Ear: Hearing and external ear normal.      Nose: Nose normal.   Eyes:      Conjunctiva/sclera: Conjunctivae normal.   Cardiovascular:      Rate and Rhythm: Normal rate.   Pulmonary:      Effort: Pulmonary effort is normal.      Breath sounds: No wheezing or rales.   Abdominal:      Palpations: Abdomen is soft.      Tenderness: There is no abdominal tenderness.   Musculoskeletal:      Cervical back: Normal range of motion.   Skin:     Findings: No rash.   Neurological:      Mental Status: Patient  is alert and oriented to person, place, and time.   Psychiatric:         Mood and Affect: Mood and affect normal.         Judgment: Judgment normal.       Ortho Exam      RIGHT SHOULDER: Mildly tender trapezius. Full forward " elevation. Good tone of deltoid, biceps, triceps, wrist extensors, and wrist flexors.  Sensation grossly intact. Neurovascular intact.  Radial pulse 2+, ulnar pulse 2+. No swelling, skin discoloration or atrophy. Skin intact. Abduction to 120 degrees. Good strength with RTC testing.       Large Joint Arthrocentesis  Date/Time: 7/18/2022 3:55 PM  Consent given by: patient  Site marked: site marked  Timeout: Immediately prior to procedure a time out was called to verify the correct patient, procedure, equipment, support staff and site/side marked as required   Supporting Documentation  Indications: pain   Procedure Details  Location: shoulder (right shoulder) -   Needle size: 22 G  Medications administered: 9 mL lidocaine 1 %; 40 mg triamcinolone acetonide 40 MG/ML  Patient tolerance: patient tolerated the procedure well with no immediate complications              Imaging Results (Most Recent)     Procedure Component Value Units Date/Time    XR Scapula Right [[id number]] Resulted: 07/18/22 1533     Updated: 07/18/22 1539           Result Review :     X-Ray Report:  Right shoulder(s) X-Ray  Indication: Evaluation of right shoulder pain   AP and Lateral view(s)  Findings: No acute fractures or dislocation.   Prior studies available for comparison: no     Assessment and Plan     Diagnoses and all orders for this visit:    1. Right shoulder pain, unspecified chronicity (Primary)  -     XR Scapula Right        Discussed injection and anti-inflammatory with the patient. He agrees with this plan. A right shoulder injection given, patient tolerated this well. He can't have anti-inflammatory due to heart medication he was advised to take only Tylenol. May consider physical therapy if failing to improve.      Call or return if worsening symptoms.    Follow Up     4-6 weeks.       Patient was given instructions and counseling regarding his condition or for health maintenance advice. Please see specific information pulled into  the AVS if appropriate.     Scribed for Omar Escoto MD by Rosalinda Lagunas.  07/18/22   15:48 EDT    I have personally performed the services described in this document as scribed by the above individual and it is both accurate and complete. Omar Escoto MD 07/18/22

## 2022-08-17 ENCOUNTER — OFFICE VISIT (OUTPATIENT)
Dept: PODIATRY | Facility: CLINIC | Age: 67
End: 2022-08-17

## 2022-08-17 VITALS
HEIGHT: 70 IN | DIASTOLIC BLOOD PRESSURE: 60 MMHG | SYSTOLIC BLOOD PRESSURE: 120 MMHG | HEART RATE: 67 BPM | BODY MASS INDEX: 26.63 KG/M2 | TEMPERATURE: 96.9 F | WEIGHT: 186 LBS | OXYGEN SATURATION: 98 %

## 2022-08-17 DIAGNOSIS — M72.2 PLANTAR FASCIAL FIBROMATOSIS OF BOTH FEET: ICD-10-CM

## 2022-08-17 DIAGNOSIS — M79.672 FOOT PAIN, BILATERAL: Primary | ICD-10-CM

## 2022-08-17 DIAGNOSIS — M79.671 FOOT PAIN, BILATERAL: Primary | ICD-10-CM

## 2022-08-17 PROCEDURE — 99203 OFFICE O/P NEW LOW 30 MIN: CPT | Performed by: PODIATRIST

## 2022-08-17 NOTE — PROGRESS NOTES
[facility] - PODIATRY    Today's Date: 08/17/22    Patient Name: Myles Gann  MRN: [id number]  CSN: [id number]  PCP: Raina Nunn APRN  Referring Provider: Referring, Self    SUBJECTIVE     Chief Complaint   Patient presents with   • Left Foot - Establish Care, Itching     Bump on bottom of foot    • Right Foot - Establish Care, Itching     Bump on bottom of foot      HPI: Myles Gann, a 67 y.o.male, presents to clinic.    New, Established, New Problem: New    Location: Bilateral medial plantar arch    Duration: Approximately 10 years    Onset: Insidious    Nature: Soft tissue growth    Stable, worsening, improving: Stable    Aggravating factors:  Patient relates pain is aggravated by shoe gear and ambulation.  Patient reports minimal painful symptoms.    Previous Treatment: None    Patient denies any fevers, chills, nausea, vomiting, shortness of breath, nor any other constitutional signs nor symptoms.    No other pedal complaints at this time.    Past Medical History:   Diagnosis Date   • Heart attack (HCC)    • Hyperlipidemia    • Hypertension      Past Surgical History:   Procedure Laterality Date   • CARDIAC SURGERY     • COLONOSCOPY N/A 1/5/2022    Procedure: COLONOSCOPY with possible biopies.;  Surgeon: Panfilo Villegas MD;  Location: [facility] ENDOSCOPY;  Service: General;  Laterality: N/A;  NORMAL COLON   • OTHER SURGICAL HISTORY      Stent in heart/ at 49 years old   • THROAT SURGERY       Family History   Problem Relation Age of Onset   • Diabetes Mother    • Diabetes Brother    • Heart disease Brother    • Malig Hyperthermia Neg Hx      Social History     Socioeconomic History   • Marital status:    Tobacco Use   • Smoking status: Never Smoker   • Smokeless tobacco: Never Used   Vaping Use   • Vaping Use: Never used   Substance and Sexual Activity   • Alcohol use: Yes     Comment: Occasionally   • Drug use: Never   • Sexual activity: Defer     No Known  Allergies  Current Outpatient Medications   Medication Sig Dispense Refill   • aspirin 325 MG tablet Take 325 mg by mouth.     • lisinopril (PRINIVIL,ZESTRIL) 20 MG tablet Take 1 tablet by mouth Daily. 90 tablet 3   • metoprolol succinate XL (TOPROL-XL) 100 MG 24 hr tablet Take 1 tablet by mouth Daily. 90 tablet 3   • pantoprazole (PROTONIX) 40 MG EC tablet Take 40 mg by mouth As Needed.     • rosuvastatin (CRESTOR) 40 MG tablet Take 1 tablet by mouth Every Night. 90 tablet 3   • vitamin D (ERGOCALCIFEROL) 1.25 MG (20529 UT) capsule capsule Take 50,000 Units by mouth Every 7 (Seven) Days.         No current facility-administered medications for this visit.     Review of Systems   Constitutional: Negative.    Musculoskeletal:        Soft tissue growth bilaterally and medial plantar arch bilaterally.   All other systems reviewed and are negative.      OBJECTIVE     Vitals:    08/17/22 1532   BP: 120/60   Pulse: 67   Temp: 96.9 °F (36.1 °C)   SpO2: 98%       No results found for: WBC, RBC, HGB, HCT, MCV, MCH, MCHC, RDW, RDWSD, MPV, PLT, NEUTRORELPCT, LYMPHORELPCT, MONORELPCT, EOSRELPCT, BASORELPCT, AUTOIGPER, NEUTROABS, LYMPHSABS, MONOSABS, EOSABS, BASOSABS, AUTOIGNUM, NRBC      Lab Results   Component Value Date    GLUCOSE 117 (H) 12/02/2020    BUN 19 12/02/2020    CREATININE 1.05 12/02/2020    BCR 18 12/02/2020    K 4.9 12/02/2020    CO2 26 12/02/2020    CALCIUM 9.3 12/02/2020    ALBUMIN 4.3 12/02/2020    LABIL2 1.6 12/02/2020    AST 25 12/02/2020    ALT 23 12/02/2020       Patient seen in no apparent distress.      PHYSICAL EXAM:     Foot/Ankle Exam:       General:   Appearance: appears stated age and healthy    Orientation: AAOx3    Affect: appropriate    Gait: unimpaired    Shoe Gear:  Casual shoes    VASCULAR      Right Foot Vascularity   Normal vascular exam    Dorsalis pedis:  2+  Posterior tibial:  2+  Skin Temperature: warm    Edema Grading:  None  CFT:  < 3 seconds  Pedal Hair Growth:  Present  Varicosities:  none       Left Foot Vascularity   Normal vascular exam    Dorsalis pedis:  2+  Posterior tibial:  2+  Skin Temperature: warm    Edema Grading:  None  CFT:  < 3 seconds  Pedal Hair Growth:  Present  Varicosities: none        NEUROLOGIC     Right Foot Neurologic   Normal sensation    Light touch sensation:  Normal  Vibratory sensation:  Normal  Hot/Cold sensation: normal    Protective Sensation using Bomb[place]-Jacob Monofilament:  10     Left Foot Neurologic   Normal sensation    Light touch sensation:  Normal  Vibratory sensation:  Normal  Hot/cold sensation: normal    Protective Sensation using [place]ay-Jacob Monofilament:  10     MUSCLE STRENGTH     Right Foot Muscle Strength   Foot dorsiflexion:  4  Foot plantar flexion:  4  Foot inversion:  4  Foot eversion:  4     Left Foot Muscle Strength   Foot dorsiflexion:  4  Foot plantar flexion:  4  Foot inversion:  4  Foot eversion:  4     RANGE OF MOTION      Right Foot Range of Motion   Foot and ankle ROM within normal limits       Left Foot Range of Motion   Foot and ankle ROM within normal limits       DERMATOLOGIC     Right Foot Dermatologic   Skin: skin intact    Nails: normal       Left Foot Dermatologic   Skin: skin intact    Nails: normal        Right Foot Additional Comments Small plantar fibroma formation and plantar medial midpoint of plantar fascial band.  Minimal tenderness to t small ouch.      Left Foot Additional Comments: Moderate plantar fibroma formation and plantar medial midpoint of plantar fascial band.  Minimal tenderness to touch.      ASSESSMENT/PLAN     Diagnoses and all orders for this visit:    1. Foot pain, bilateral (Primary)    2. Plantar fascial fibromatosis of both feet        Comprehensive lower extremity examination and evaluation was performed.    Discussed findings and treatment plan including risks, benefits, and treatment options with patient in detail. Patient agreed with treatment plan.    Medications and allergies reviewed.   Reviewed available lab values along with other pertinent labs.  These were discussed with the patient.    Conservative and surgical options were discussed with the patient.  Patient states his symptoms do not warrant any further treatment this time but will consider these treatment options if the symptoms worsen.    Patient is to monitor for recurrence and any new symptoms and to contact Dr. Olson's office for a follow-up appointment.      The patient states understanding and agreement with this plan.    An After Visit Summary was printed and given to the patient at discharge, including (if requested) any available informative/educational handouts regarding diagnosis, treatment, or medications. All questions were answered to patient/family satisfaction. Should symptoms fail to improve or worsen they agree to call or return to clinic or to go to the Emergency Department. Discussed the importance of following up with any needed screening tests/labs/specialist appointments and any requested follow-up recommended by me today. Importance of maintaining follow-up discussed and patient accepts that missed appointments can delay diagnosis and potentially lead to worsening of conditions.    Return if symptoms worsen or fail to improve., or sooner if acute issues arise.    This document has been electronically signed by Rios Olson DPM on August 17, 2022 16:11 EDT

## 2022-09-09 ENCOUNTER — LAB (OUTPATIENT)
Dept: LAB | Facility: HOSPITAL | Age: 67
End: 2022-09-09

## 2022-09-09 DIAGNOSIS — I10 ESSENTIAL HYPERTENSION: ICD-10-CM

## 2022-09-09 DIAGNOSIS — E78.2 MIXED HYPERLIPIDEMIA: ICD-10-CM

## 2022-09-09 DIAGNOSIS — I25.10 CORONARY ARTERY DISEASE INVOLVING NATIVE CORONARY ARTERY OF NATIVE HEART WITHOUT ANGINA PECTORIS: ICD-10-CM

## 2022-09-09 LAB
ALBUMIN SERPL-MCNC: 4.7 G/DL (ref 3.5–5.2)
ALBUMIN/GLOB SERPL: 2.1 G/DL
ALP SERPL-CCNC: 57 U/L (ref 39–117)
ALT SERPL W P-5'-P-CCNC: 30 U/L (ref 1–41)
ANION GAP SERPL CALCULATED.3IONS-SCNC: 10.7 MMOL/L (ref 5–15)
AST SERPL-CCNC: 28 U/L (ref 1–40)
BILIRUB SERPL-MCNC: 0.8 MG/DL (ref 0–1.2)
BUN SERPL-MCNC: 20 MG/DL (ref 8–23)
BUN/CREAT SERPL: 15.7 (ref 7–25)
CALCIUM SPEC-SCNC: 10.2 MG/DL (ref 8.6–10.5)
CHLORIDE SERPL-SCNC: 101 MMOL/L (ref 98–107)
CHOLEST SERPL-MCNC: 124 MG/DL (ref 0–200)
CO2 SERPL-SCNC: 26.3 MMOL/L (ref 22–29)
CREAT SERPL-MCNC: 1.27 MG/DL (ref 0.76–1.27)
EGFRCR SERPLBLD CKD-EPI 2021: 61.9 ML/MIN/1.73
GLOBULIN UR ELPH-MCNC: 2.2 GM/DL
GLUCOSE SERPL-MCNC: 128 MG/DL (ref 65–99)
HDLC SERPL-MCNC: 37 MG/DL (ref 40–60)
LDLC SERPL CALC-MCNC: 72 MG/DL (ref 0–100)
LDLC/HDLC SERPL: 1.94 {RATIO}
POTASSIUM SERPL-SCNC: 5.5 MMOL/L (ref 3.5–5.2)
PROT SERPL-MCNC: 6.9 G/DL (ref 6–8.5)
SODIUM SERPL-SCNC: 138 MMOL/L (ref 136–145)
TRIGL SERPL-MCNC: 76 MG/DL (ref 0–150)
VLDLC SERPL-MCNC: 15 MG/DL (ref 5–40)

## 2022-09-09 PROCEDURE — 80053 COMPREHEN METABOLIC PANEL: CPT

## 2022-09-09 PROCEDURE — 80061 LIPID PANEL: CPT

## 2022-10-14 DIAGNOSIS — I25.10 CORONARY ARTERY DISEASE INVOLVING NATIVE CORONARY ARTERY OF NATIVE HEART WITHOUT ANGINA PECTORIS: ICD-10-CM

## 2022-10-14 DIAGNOSIS — I10 ESSENTIAL HYPERTENSION: ICD-10-CM

## 2022-10-14 DIAGNOSIS — E78.2 MIXED HYPERLIPIDEMIA: ICD-10-CM

## 2022-10-14 RX ORDER — METOPROLOL SUCCINATE 100 MG/1
TABLET, EXTENDED RELEASE ORAL
Qty: 90 TABLET | Refills: 2 | Status: SHIPPED | OUTPATIENT
Start: 2022-10-14

## 2022-10-14 RX ORDER — LISINOPRIL 20 MG/1
TABLET ORAL
Qty: 90 TABLET | Refills: 2 | Status: SHIPPED | OUTPATIENT
Start: 2022-10-14

## 2022-10-14 RX ORDER — ROSUVASTATIN CALCIUM 40 MG/1
TABLET, COATED ORAL
Qty: 90 TABLET | Refills: 2 | Status: SHIPPED | OUTPATIENT
Start: 2022-10-14

## 2023-03-17 ENCOUNTER — TELEPHONE (OUTPATIENT)
Dept: CARDIOLOGY | Facility: CLINIC | Age: 68
End: 2023-03-17
Payer: COMMERCIAL

## 2023-03-17 DIAGNOSIS — E78.2 MIXED HYPERLIPIDEMIA: Primary | ICD-10-CM

## 2023-03-17 DIAGNOSIS — I10 ESSENTIAL HYPERTENSION: ICD-10-CM

## 2023-04-17 ENCOUNTER — LAB (OUTPATIENT)
Dept: LAB | Facility: HOSPITAL | Age: 68
End: 2023-04-17
Payer: COMMERCIAL

## 2023-04-17 DIAGNOSIS — I10 ESSENTIAL HYPERTENSION: ICD-10-CM

## 2023-04-17 DIAGNOSIS — E78.2 MIXED HYPERLIPIDEMIA: ICD-10-CM

## 2023-04-17 LAB
ALBUMIN SERPL-MCNC: 4.6 G/DL (ref 3.5–5.2)
ALBUMIN/GLOB SERPL: 1.7 G/DL
ALP SERPL-CCNC: 53 U/L (ref 39–117)
ALT SERPL W P-5'-P-CCNC: 19 U/L (ref 1–41)
ANION GAP SERPL CALCULATED.3IONS-SCNC: 10 MMOL/L (ref 5–15)
AST SERPL-CCNC: 25 U/L (ref 1–40)
BILIRUB SERPL-MCNC: 0.5 MG/DL (ref 0–1.2)
BUN SERPL-MCNC: 22 MG/DL (ref 8–23)
BUN/CREAT SERPL: 21 (ref 7–25)
CALCIUM SPEC-SCNC: 9.2 MG/DL (ref 8.6–10.5)
CHLORIDE SERPL-SCNC: 102 MMOL/L (ref 98–107)
CHOLEST SERPL-MCNC: 115 MG/DL (ref 0–200)
CO2 SERPL-SCNC: 25 MMOL/L (ref 22–29)
CREAT SERPL-MCNC: 1.05 MG/DL (ref 0.76–1.27)
EGFRCR SERPLBLD CKD-EPI 2021: 77.3 ML/MIN/1.73
GLOBULIN UR ELPH-MCNC: 2.7 GM/DL
GLUCOSE SERPL-MCNC: 135 MG/DL (ref 65–99)
HDLC SERPL-MCNC: 47 MG/DL (ref 40–60)
LDLC SERPL CALC-MCNC: 55 MG/DL (ref 0–100)
LDLC/HDLC SERPL: 1.2 {RATIO}
POTASSIUM SERPL-SCNC: 4.7 MMOL/L (ref 3.5–5.2)
PROT SERPL-MCNC: 7.3 G/DL (ref 6–8.5)
SODIUM SERPL-SCNC: 137 MMOL/L (ref 136–145)
TRIGL SERPL-MCNC: 57 MG/DL (ref 0–150)
VLDLC SERPL-MCNC: 13 MG/DL (ref 5–40)

## 2023-04-17 PROCEDURE — 80053 COMPREHEN METABOLIC PANEL: CPT

## 2023-04-17 PROCEDURE — 80061 LIPID PANEL: CPT

## 2023-04-17 PROCEDURE — 36415 COLL VENOUS BLD VENIPUNCTURE: CPT

## 2023-05-08 ENCOUNTER — OFFICE VISIT (OUTPATIENT)
Dept: CARDIOLOGY | Facility: CLINIC | Age: 68
End: 2023-05-08
Payer: COMMERCIAL

## 2023-05-08 VITALS
HEIGHT: 70 IN | SYSTOLIC BLOOD PRESSURE: 126 MMHG | WEIGHT: 189 LBS | BODY MASS INDEX: 27.06 KG/M2 | HEART RATE: 72 BPM | DIASTOLIC BLOOD PRESSURE: 65 MMHG

## 2023-05-08 DIAGNOSIS — Z00.00 ANNUAL PHYSICAL EXAM: ICD-10-CM

## 2023-05-08 DIAGNOSIS — I25.10 CORONARY ARTERY DISEASE INVOLVING NATIVE CORONARY ARTERY OF NATIVE HEART WITHOUT ANGINA PECTORIS: Primary | ICD-10-CM

## 2023-05-08 DIAGNOSIS — I10 ESSENTIAL HYPERTENSION: ICD-10-CM

## 2023-05-08 DIAGNOSIS — E78.2 MIXED HYPERLIPIDEMIA: ICD-10-CM

## 2023-05-08 RX ORDER — METOPROLOL SUCCINATE 100 MG/1
100 TABLET, EXTENDED RELEASE ORAL DAILY
Qty: 90 TABLET | Refills: 3 | Status: SHIPPED | OUTPATIENT
Start: 2023-05-08

## 2023-05-08 RX ORDER — LISINOPRIL 20 MG/1
20 TABLET ORAL DAILY
Qty: 90 TABLET | Refills: 3 | Status: SHIPPED | OUTPATIENT
Start: 2023-05-08

## 2023-05-08 RX ORDER — ROSUVASTATIN CALCIUM 40 MG/1
40 TABLET, COATED ORAL
Qty: 90 TABLET | Refills: 3 | Status: SHIPPED | OUTPATIENT
Start: 2023-05-08

## 2023-05-20 PROBLEM — I21.9 HEART ATTACK: Status: RESOLVED | Noted: 2021-07-18 | Resolved: 2023-05-20

## 2023-05-20 PROBLEM — Z00.00 ANNUAL PHYSICAL EXAM: Status: ACTIVE | Noted: 2021-08-31

## 2023-05-20 NOTE — ASSESSMENT & PLAN NOTE
Blood pressure well controlled.  Recent labs showed normal renal functions and electrolytes.  Continue lisinopril and metoprolol at the current dose.

## 2023-05-20 NOTE — ASSESSMENT & PLAN NOTE
He is currently stable with no angina.  LV systolic function is preserved.  Continue aspirin, statin and beta-blocker at the current dose.

## 2023-05-20 NOTE — ASSESSMENT & PLAN NOTE
Mr. Gann is requesting to be referred to a new primary care provider, to be closer to home.  We will make a referral to Dr. Goodman's office.

## 2023-05-20 NOTE — PROGRESS NOTES
CARDIOLOGY FOLLOW-UP PROGRESS NOTE        Chief Complaint  Follow-up, Coronary Artery Disease, Hypertension, and Hyperlipidemia    Subjective            Myles Gann presents to [facility] CARDIOLOGY  History of Present Illness    Mr Gann is here for annual follow-up visit for coronary artery disease and hyperlipidemia.  He denies any new complaints.  Specifically, he denies any recent episodes of chest pain, shortness of breath, palpitations or dizziness.  He is taking all the medications as prescribed.      Past History:    (1) Premature coronary artery disease. Cardiac catheterization on 11/08/2004 at [facility] in [place], [place], with 90% stenosis involving the proximal left anterior descending artery in the setting of acute coronary syndrome. He underwent 3.0 Cypher stent placement. Other arteries were normal. (2) Hypertension. (3) Hyperlipidemia.     Medical History:  Past Medical History:   Diagnosis Date   • Heart attack    • Hyperlipidemia    • Hypertension        Surgical History: has a past surgical history that includes Cardiac surgery; Throat surgery; Other surgical history; and Colonoscopy (N/A, 1/5/2022).     Family History: family history includes Diabetes in his brother and mother; Heart disease in his brother.     Social History: reports that he has never smoked. He has never used smokeless tobacco. He reports current alcohol use. He reports that he does not use drugs.    Allergies: Patient has no known allergies.    Current Outpatient Medications on File Prior to Visit   Medication Sig   • aspirin 325 MG tablet Take 1 tablet by mouth.   • vitamin D (ERGOCALCIFEROL) 1.25 MG (94391 UT) capsule capsule Take 1 capsule by mouth Every 7 (Seven) Days.       No current facility-administered medications on file prior to visit.          Review of Systems   Respiratory: Negative for cough, shortness of breath and wheezing.    Cardiovascular: Negative for  "chest pain, palpitations and leg swelling.   Gastrointestinal: Negative for nausea and vomiting.   Neurological: Negative for dizziness and syncope.        Objective     /65   Pulse 72   Ht 177.8 cm (70\")   Wt 85.7 kg (189 lb)   BMI 27.12 kg/m²       Physical Exam    General : Alert, awake, no acute distress  Neck : Supple, no carotid bruit, no jugular venous distention  CVS : Regular rate and rhythm, no murmur, rubs or gallops  Lungs: Clear to auscultation bilaterally, no crackles or rhonchi  Abdomen: Soft, nontender, bowel sounds heard in all 4 quadrants  Extremities: Warm, well-perfused, no pedal edema    Result Review :     The following data was reviewed by: Everett Gallagher MD on 05/08/2023:    CMP        9/9/2022    06:38 4/17/2023    06:39   CMP   Glucose 128   135     BUN 20   22     Creatinine 1.27   1.05     EGFR 61.9   77.3     Sodium 138   137     Potassium 5.5   4.7     Chloride 101   102     Calcium 10.2   9.2     Total Protein 6.9   7.3     Albumin 4.70   4.6     Globulin 2.2   2.7     Total Bilirubin 0.8   0.5     Alkaline Phosphatase 57   53     AST (SGOT) 28   25     ALT (SGPT) 30   19     Albumin/Globulin Ratio 2.1   1.7     BUN/Creatinine Ratio 15.7   21.0     Anion Gap 10.7   10.0           Lipid Panel        9/9/2022    06:38 4/17/2023    06:39   Lipid Panel   Total Cholesterol 124   115     Triglycerides 76   57     HDL Cholesterol 37   47     VLDL Cholesterol 15   13     LDL Cholesterol  72   55     LDL/HDL Ratio 1.94   1.20            Data reviewed: Cardiology studies        Echocardiogram done on 8/10/2020 showed    1.  Normal left ventricular systolic function with estimated LV ejection fraction of 55%.   2.  Mild mitral regurgitation.                  Assessment and Plan        Diagnoses and all orders for this visit:    1. Coronary artery disease involving native coronary artery of native heart without angina pectoris (Primary)  Assessment & Plan:  He is currently stable with " no angina.  LV systolic function is preserved.  Continue aspirin, statin and beta-blocker at the current dose.    Orders:  -     metoprolol succinate XL (TOPROL-XL) 100 MG 24 hr tablet; Take 1 tablet by mouth Daily.  Dispense: 90 tablet; Refill: 3  -     rosuvastatin (CRESTOR) 40 MG tablet; Take 1 tablet by mouth every night at bedtime.  Dispense: 90 tablet; Refill: 3  -     CBC (No Diff); Future    2. Essential hypertension  Assessment & Plan:  Blood pressure well controlled.  Recent labs showed normal renal functions and electrolytes.  Continue lisinopril and metoprolol at the current dose.    Orders:  -     lisinopril (PRINIVIL,ZESTRIL) 20 MG tablet; Take 1 tablet by mouth Daily.  Dispense: 90 tablet; Refill: 3  -     metoprolol succinate XL (TOPROL-XL) 100 MG 24 hr tablet; Take 1 tablet by mouth Daily.  Dispense: 90 tablet; Refill: 3    3. Mixed hyperlipidemia  Assessment & Plan:  Most recent LDL is 55, which is at goal.  Continue rosuvastatin 40 mg nightly.  We will repeat lipid panel before next visit.    Orders:  -     rosuvastatin (CRESTOR) 40 MG tablet; Take 1 tablet by mouth every night at bedtime.  Dispense: 90 tablet; Refill: 3  -     Lipid Panel; Future  -     Comprehensive Metabolic Panel; Future    4. Annual physical exam  Assessment & Plan:  Mr. Gann is requesting to be referred to a new primary care provider, to be closer to home.  We will make a referral to Dr. Goodman's office.    Orders:  -     Ambulatory Referral to Family Practice            Follow Up     Return in about 1 year (around 5/8/2024) for Next scheduled follow up.    Patient was given instructions and counseling regarding his condition or for health maintenance advice. Please see specific information pulled into the AVS if appropriate.

## 2023-05-20 NOTE — ASSESSMENT & PLAN NOTE
Most recent LDL is 55, which is at goal.  Continue rosuvastatin 40 mg nightly.  We will repeat lipid panel before next visit.

## 2023-06-13 ENCOUNTER — LAB (OUTPATIENT)
Dept: LAB | Facility: HOSPITAL | Age: 68
End: 2023-06-13
Payer: COMMERCIAL

## 2023-06-13 ENCOUNTER — OFFICE VISIT (OUTPATIENT)
Dept: FAMILY MEDICINE CLINIC | Facility: CLINIC | Age: 68
End: 2023-06-13
Payer: COMMERCIAL

## 2023-06-13 VITALS
TEMPERATURE: 97.4 F | RESPIRATION RATE: 16 BRPM | DIASTOLIC BLOOD PRESSURE: 62 MMHG | BODY MASS INDEX: 27.11 KG/M2 | WEIGHT: 189.4 LBS | SYSTOLIC BLOOD PRESSURE: 120 MMHG | HEIGHT: 70 IN | OXYGEN SATURATION: 98 % | HEART RATE: 91 BPM

## 2023-06-13 DIAGNOSIS — Z76.89 ENCOUNTER TO ESTABLISH CARE: ICD-10-CM

## 2023-06-13 DIAGNOSIS — I25.10 CORONARY ARTERY DISEASE INVOLVING NATIVE CORONARY ARTERY OF NATIVE HEART WITHOUT ANGINA PECTORIS: ICD-10-CM

## 2023-06-13 DIAGNOSIS — Z12.5 SCREENING FOR PROSTATE CANCER: ICD-10-CM

## 2023-06-13 DIAGNOSIS — I10 ESSENTIAL HYPERTENSION: ICD-10-CM

## 2023-06-13 DIAGNOSIS — Z95.5 STENTED CORONARY ARTERY: ICD-10-CM

## 2023-06-13 DIAGNOSIS — E55.9 VITAMIN D INSUFFICIENCY: ICD-10-CM

## 2023-06-13 DIAGNOSIS — Z00.00 ANNUAL PHYSICAL EXAM: Primary | ICD-10-CM

## 2023-06-13 LAB
HBA1C MFR BLD: 6.4 % (ref 4.8–5.6)
PSA SERPL-MCNC: 0.63 NG/ML (ref 0–4)
TSH SERPL DL<=0.05 MIU/L-ACNC: 2.62 UIU/ML (ref 0.27–4.2)

## 2023-06-13 PROCEDURE — 36415 COLL VENOUS BLD VENIPUNCTURE: CPT

## 2023-06-13 PROCEDURE — G0103 PSA SCREENING: HCPCS

## 2023-06-13 PROCEDURE — 99387 INIT PM E/M NEW PAT 65+ YRS: CPT | Performed by: STUDENT IN AN ORGANIZED HEALTH CARE EDUCATION/TRAINING PROGRAM

## 2023-06-13 PROCEDURE — 83036 HEMOGLOBIN GLYCOSYLATED A1C: CPT

## 2023-06-13 PROCEDURE — 84443 ASSAY THYROID STIM HORMONE: CPT

## 2023-06-13 PROCEDURE — 85025 COMPLETE CBC W/AUTO DIFF WBC: CPT

## 2023-06-13 RX ORDER — ERGOCALCIFEROL 1.25 MG/1
50000 CAPSULE ORAL
Qty: 12 CAPSULE | Refills: 5 | Status: SHIPPED | OUTPATIENT
Start: 2023-06-13

## 2023-06-13 NOTE — PROGRESS NOTES
"Chief Complaint  Establishing care for annual physical    Subjective         Myles Gann is a 68 y.o. male who presents to [facility]    68 years old comes to the clinic today to establish care for annual physical.    Patient is following up with cardiologist for coronary artery disease, stented coronary arteries.  Stable cardiac disease.  On aspirin/lisinopril/metoprolol and Crestor.  Reports very good compliance and currently asymptomatic.    Denies any chest pain or shortness of breath on exertion, physically active without any difficulties.    Patient had colonoscopy done with normal readings recently, non-smoker.    12+ review of systems are unremarkable.    Reports no other acute concerns    Objective   Vital Signs:   Vitals:    06/13/23 1428   BP: 120/62   BP Location: Left arm   Patient Position: Sitting   Cuff Size: Large Adult   Pulse: 91   Resp: 16   Temp: 97.4 °F (36.3 °C)   TempSrc: Temporal   SpO2: 98%   Weight: 85.9 kg (189 lb 6.4 oz)   Height: 177.8 cm (70\")      Body mass index is 27.18 kg/m².   Wt Readings from Last 3 Encounters:   06/13/23 85.9 kg (189 lb 6.4 oz)   05/08/23 85.7 kg (189 lb)   08/17/22 84.4 kg (186 lb)      BP Readings from Last 3 Encounters:   06/13/23 120/62   05/08/23 126/65   08/17/22 120/60        Patient Care Team:  Isaac Goodman MD as PCP - General (Family Medicine)  [place], April, APRN as Nurse Practitioner (Nurse Practitioner)     Physical Exam  Vitals reviewed.   Constitutional:       Appearance: Normal appearance. He is well-developed.   HENT:      Head: Normocephalic and atraumatic.      Right Ear: External ear normal.      Left Ear: External ear normal.      Mouth/Throat:      Pharynx: No oropharyngeal exudate.   Eyes:      Conjunctiva/sclera: Conjunctivae normal.      Pupils: Pupils are equal, round, and reactive to light.   Cardiovascular:      Rate and Rhythm: Normal rate and regular rhythm.      Heart sounds: No murmur " heard.    No friction rub. No gallop.   Pulmonary:      Effort: Pulmonary effort is normal.      Breath sounds: Normal breath sounds. No wheezing or rhonchi.   Abdominal:      General: Bowel sounds are normal. There is no distension.      Palpations: Abdomen is soft.      Tenderness: There is no abdominal tenderness.   Skin:     General: Skin is warm and dry.   Neurological:      Mental Status: He is alert and oriented to person, place, and time.      Cranial Nerves: No cranial nerve deficit.   Psychiatric:         Mood and Affect: Mood and affect normal.         Behavior: Behavior normal.         Thought Content: Thought content normal.         Judgment: Judgment normal.                     Assessment and Plan   Diagnoses and all orders for this visit:    1. Annual physical exam (Primary)  Comments:  Daily exercise and healthy diet recommended    2. Encounter to establish care  -     PSA SCREENING; Future  -     TSH Rfx On Abnormal To Free T4; Future  -     CBC & Differential; Future  -     Hemoglobin A1c; Future    3. Essential hypertension  Comments:  Chronic, controlled on lisinopril/metoprolol.  DASH diet recommended.  Orders:  -     PSA SCREENING; Future  -     TSH Rfx On Abnormal To Free T4; Future  -     CBC & Differential; Future  -     Hemoglobin A1c; Future    4. Coronary artery disease involving native coronary artery of native heart without angina pectoris  Comments:  Continue with cardiologist, recommendations reviewed and appreciated  Orders:  -     PSA SCREENING; Future  -     TSH Rfx On Abnormal To Free T4; Future  -     CBC & Differential; Future  -     Hemoglobin A1c; Future    5. Vitamin D insufficiency  -     vitamin D (ERGOCALCIFEROL) 1.25 MG (14258 UT) capsule capsule; Take 1 capsule by mouth Every 7 (Seven) Days.    Dispense: 12 capsule; Refill: 5  -     PSA SCREENING; Future  -     TSH Rfx On Abnormal To Free T4; Future  -     CBC & Differential; Future  -     Hemoglobin A1c; Future    6.  Screening for prostate cancer  -     PSA SCREENING; Future  -     TSH Rfx On Abnormal To Free T4; Future  -     CBC & Differential; Future  -     Hemoglobin A1c; Future    7. Stented coronary artery      Preventive cares reviewed and discussed, patient reports he is up-to-date with all the vaccinations and preventive cares.  Records needed    Tobacco Use: Low Risk     Smoking Tobacco Use: Never    Smokeless Tobacco Use: Never    Passive Exposure: Not on file            Follow Up   Return in about 1 year (around 6/19/2024) for Annual physical.  Patient was given instructions and counseling regarding his condition or for health maintenance advice. Please see specific information pulled into the AVS if appropriate.

## 2023-06-14 LAB
BASOPHILS # BLD AUTO: 0.04 10*3/MM3 (ref 0–0.2)
BASOPHILS NFR BLD AUTO: 0.7 % (ref 0–1.5)
DEPRECATED RDW RBC AUTO: 40.2 FL (ref 37–54)
EOSINOPHIL # BLD AUTO: 0.36 10*3/MM3 (ref 0–0.4)
EOSINOPHIL NFR BLD AUTO: 5.9 % (ref 0.3–6.2)
ERYTHROCYTE [DISTWIDTH] IN BLOOD BY AUTOMATED COUNT: 12.9 % (ref 12.3–15.4)
HCT VFR BLD AUTO: 40.6 % (ref 37.5–51)
HGB BLD-MCNC: 13.9 G/DL (ref 13–17.7)
IMM GRANULOCYTES # BLD AUTO: 0.01 10*3/MM3 (ref 0–0.05)
IMM GRANULOCYTES NFR BLD AUTO: 0.2 % (ref 0–0.5)
LYMPHOCYTES # BLD AUTO: 2.26 10*3/MM3 (ref 0.7–3.1)
LYMPHOCYTES NFR BLD AUTO: 36.9 % (ref 19.6–45.3)
MCH RBC QN AUTO: 29.1 PG (ref 26.6–33)
MCHC RBC AUTO-ENTMCNC: 34.2 G/DL (ref 31.5–35.7)
MCV RBC AUTO: 84.9 FL (ref 79–97)
MONOCYTES # BLD AUTO: 0.6 10*3/MM3 (ref 0.1–0.9)
MONOCYTES NFR BLD AUTO: 9.8 % (ref 5–12)
NEUTROPHILS NFR BLD AUTO: 2.86 10*3/MM3 (ref 1.7–7)
NEUTROPHILS NFR BLD AUTO: 46.5 % (ref 42.7–76)
NRBC BLD AUTO-RTO: 0 /100 WBC (ref 0–0.2)
PLATELET # BLD AUTO: 203 10*3/MM3 (ref 140–450)
PMV BLD AUTO: 11.1 FL (ref 6–12)
RBC # BLD AUTO: 4.78 10*6/MM3 (ref 4.14–5.8)
WBC NRBC COR # BLD: 6.13 10*3/MM3 (ref 3.4–10.8)

## 2023-06-16 ENCOUNTER — PATIENT ROUNDING (BHMG ONLY) (OUTPATIENT)
Dept: FAMILY MEDICINE CLINIC | Facility: CLINIC | Age: 68
End: 2023-06-16
Payer: COMMERCIAL

## 2023-07-31 ENCOUNTER — OFFICE VISIT (OUTPATIENT)
Dept: PODIATRY | Facility: CLINIC | Age: 68
End: 2023-07-31
Payer: COMMERCIAL

## 2023-07-31 VITALS
HEIGHT: 70 IN | OXYGEN SATURATION: 100 % | TEMPERATURE: 97.1 F | HEART RATE: 67 BPM | WEIGHT: 188 LBS | BODY MASS INDEX: 26.92 KG/M2

## 2023-07-31 DIAGNOSIS — M79.672 FOOT PAIN, LEFT: Primary | ICD-10-CM

## 2023-07-31 DIAGNOSIS — M72.2 PLANTAR FASCIAL FIBROMATOSIS OF LEFT FOOT: ICD-10-CM

## 2023-07-31 PROCEDURE — 99214 OFFICE O/P EST MOD 30 MIN: CPT | Performed by: PODIATRIST

## 2023-08-01 PROBLEM — M72.2 PLANTAR FASCIAL FIBROMATOSIS OF LEFT FOOT: Status: ACTIVE | Noted: 2023-08-01

## 2023-08-23 ENCOUNTER — OFFICE VISIT (OUTPATIENT)
Dept: FAMILY MEDICINE CLINIC | Facility: CLINIC | Age: 68
End: 2023-08-23
Payer: COMMERCIAL

## 2023-08-23 VITALS
WEIGHT: 189.1 LBS | DIASTOLIC BLOOD PRESSURE: 62 MMHG | SYSTOLIC BLOOD PRESSURE: 136 MMHG | HEART RATE: 55 BPM | RESPIRATION RATE: 16 BRPM | OXYGEN SATURATION: 98 % | HEIGHT: 70 IN | BODY MASS INDEX: 27.07 KG/M2 | TEMPERATURE: 97.8 F

## 2023-08-23 DIAGNOSIS — L29.8 ITCHY NOSE: ICD-10-CM

## 2023-08-23 DIAGNOSIS — L01.00 IMPETIGO: ICD-10-CM

## 2023-08-23 DIAGNOSIS — L70.0 ACNE VULGARIS: Primary | ICD-10-CM

## 2023-08-23 PROCEDURE — 99213 OFFICE O/P EST LOW 20 MIN: CPT | Performed by: STUDENT IN AN ORGANIZED HEALTH CARE EDUCATION/TRAINING PROGRAM

## 2023-08-23 RX ORDER — DOXYCYCLINE HYCLATE 100 MG/1
CAPSULE ORAL
Qty: 58 CAPSULE | Refills: 0 | Status: SHIPPED | OUTPATIENT
Start: 2023-08-23 | End: 2023-10-06

## 2023-08-23 RX ORDER — PREDNISONE 20 MG/1
20 TABLET ORAL 2 TIMES DAILY
Qty: 10 TABLET | Refills: 0 | Status: SHIPPED | OUTPATIENT
Start: 2023-08-23

## 2023-08-23 NOTE — PROGRESS NOTES
"Chief Complaint  Bumps on the nose    Subjective         Myles Gann is a 68 y.o. male who presents to [facility]    68 years old comes to the clinic today to follow-up on nodular skin on the nose.    Patient reports last few weeks active symptoms of worsening nodular pimple with some serosanguineous fluids occasionally.  Seen in the urgent care prescribed some antibiotics which improved temporarily.  Still complains of lots of itching, patient has appointment with dermatologist already scheduled.    Reports no other acute complaint    Objective   Vital Signs:   Vitals:    08/23/23 1448   BP: 136/62   BP Location: Left arm   Patient Position: Sitting   Cuff Size: Adult   Pulse: 55   Resp: 16   Temp: 97.8 øF (36.6 øC)   TempSrc: Temporal   SpO2: 98%   Weight: 85.8 kg (189 lb 1.6 oz)   Height: 177.8 cm (70\")      Body mass index is 27.13 kg/mý.   Wt Readings from Last 3 Encounters:   08/23/23 85.8 kg (189 lb 1.6 oz)   07/31/23 85.3 kg (188 lb)   06/13/23 85.9 kg (189 lb 6.4 oz)      BP Readings from Last 3 Encounters:   08/23/23 136/62   07/17/23 129/71   06/13/23 120/62        Patient Care Team:  Isaac Goodman MD as PCP - General (Family Medicine)  [place]ry April, APRN as Nurse Practitioner (Nurse Practitioner)     Physical Exam  HENT:      Head:        Comments: Some nodular acne with some localized scarring noted around the nose with some superficial skin irritation and darkening                         Assessment and Plan   Diagnoses and all orders for this visit:    1. Acne vulgaris (Primary)  -     predniSONE (DELTASONE) 20 MG tablet; Take 1 tablet by mouth 2 (Two) Times a Day.  Dispense: 10 tablet; Refill: 0  -     doxycycline (VIBRAMYCIN) 100 MG capsule; Take 1 capsule by mouth 2 (Two) Times a Day for 14 days, THEN 1 capsule Daily for 30 days.  Dispense: 58 capsule; Refill: 0  -     triamcinolone (KENALOG) 0.1 % ointment; Apply 1 application  topically to the appropriate " area as directed 2 (Two) Times a Day.  Dispense: 80 g; Refill: 1    2. Impetigo  -     predniSONE (DELTASONE) 20 MG tablet; Take 1 tablet by mouth 2 (Two) Times a Day.  Dispense: 10 tablet; Refill: 0  -     doxycycline (VIBRAMYCIN) 100 MG capsule; Take 1 capsule by mouth 2 (Two) Times a Day for 14 days, THEN 1 capsule Daily for 30 days.  Dispense: 58 capsule; Refill: 0  -     triamcinolone (KENALOG) 0.1 % ointment; Apply 1 application  topically to the appropriate area as directed 2 (Two) Times a Day.  Dispense: 80 g; Refill: 1    3. Itchy nose  -     predniSONE (DELTASONE) 20 MG tablet; Take 1 tablet by mouth 2 (Two) Times a Day.  Dispense: 10 tablet; Refill: 0  -     doxycycline (VIBRAMYCIN) 100 MG capsule; Take 1 capsule by mouth 2 (Two) Times a Day for 14 days, THEN 1 capsule Daily for 30 days.  Dispense: 58 capsule; Refill: 0  -     triamcinolone (KENALOG) 0.1 % ointment; Apply 1 application  topically to the appropriate area as directed 2 (Two) Times a Day.  Dispense: 80 g; Refill: 1      Follow-up with dermatologist  Patient to call me if no improvement or any worsening or any other changes    Tobacco Use: Low Risk     Smoking Tobacco Use: Never    Smokeless Tobacco Use: Never    Passive Exposure: Never            Follow Up   Return if symptoms worsen or fail to improve.  Patient was given instructions and counseling regarding his condition or for health maintenance advice. Please see specific information pulled into the AVS if appropriate.

## 2023-09-29 ENCOUNTER — TELEPHONE (OUTPATIENT)
Dept: FAMILY MEDICINE CLINIC | Facility: CLINIC | Age: 68
End: 2023-09-29
Payer: COMMERCIAL

## 2023-09-29 NOTE — TELEPHONE ENCOUNTER
Received medical records from [facility] office via fax. Medical records have been indexed into chart.

## 2024-04-15 ENCOUNTER — LAB (OUTPATIENT)
Dept: LAB | Facility: HOSPITAL | Age: 69
End: 2024-04-15
Payer: COMMERCIAL

## 2024-04-15 DIAGNOSIS — I25.10 CORONARY ARTERY DISEASE INVOLVING NATIVE CORONARY ARTERY OF NATIVE HEART WITHOUT ANGINA PECTORIS: ICD-10-CM

## 2024-04-15 DIAGNOSIS — E78.2 MIXED HYPERLIPIDEMIA: ICD-10-CM

## 2024-04-15 LAB
ALBUMIN SERPL-MCNC: 4.5 G/DL (ref 3.5–5.2)
ALBUMIN/GLOB SERPL: 1.7 G/DL
ALP SERPL-CCNC: 52 U/L (ref 39–117)
ALT SERPL W P-5'-P-CCNC: 29 U/L (ref 1–41)
ANION GAP SERPL CALCULATED.3IONS-SCNC: 7 MMOL/L (ref 5–15)
AST SERPL-CCNC: 24 U/L (ref 1–40)
BILIRUB SERPL-MCNC: 0.6 MG/DL (ref 0–1.2)
BUN SERPL-MCNC: 16 MG/DL (ref 8–23)
BUN/CREAT SERPL: 12.9 (ref 7–25)
CALCIUM SPEC-SCNC: 9.8 MG/DL (ref 8.6–10.5)
CHLORIDE SERPL-SCNC: 103 MMOL/L (ref 98–107)
CHOLEST SERPL-MCNC: 127 MG/DL (ref 0–200)
CO2 SERPL-SCNC: 27 MMOL/L (ref 22–29)
CREAT SERPL-MCNC: 1.24 MG/DL (ref 0.76–1.27)
DEPRECATED RDW RBC AUTO: 39.8 FL (ref 37–54)
EGFRCR SERPLBLD CKD-EPI 2021: 62.9 ML/MIN/1.73
ERYTHROCYTE [DISTWIDTH] IN BLOOD BY AUTOMATED COUNT: 12.8 % (ref 12.3–15.4)
GLOBULIN UR ELPH-MCNC: 2.6 GM/DL
GLUCOSE SERPL-MCNC: 150 MG/DL (ref 65–99)
HCT VFR BLD AUTO: 44.9 % (ref 37.5–51)
HDLC SERPL-MCNC: 45 MG/DL (ref 40–60)
HGB BLD-MCNC: 14.9 G/DL (ref 13–17.7)
LDLC SERPL CALC-MCNC: 63 MG/DL (ref 0–100)
LDLC/HDLC SERPL: 1.36 {RATIO}
MCH RBC QN AUTO: 28.7 PG (ref 26.6–33)
MCHC RBC AUTO-ENTMCNC: 33.2 G/DL (ref 31.5–35.7)
MCV RBC AUTO: 86.3 FL (ref 79–97)
PLATELET # BLD AUTO: 231 10*3/MM3 (ref 140–450)
PMV BLD AUTO: 10.7 FL (ref 6–12)
POTASSIUM SERPL-SCNC: 4.4 MMOL/L (ref 3.5–5.2)
PROT SERPL-MCNC: 7.1 G/DL (ref 6–8.5)
RBC # BLD AUTO: 5.2 10*6/MM3 (ref 4.14–5.8)
SODIUM SERPL-SCNC: 137 MMOL/L (ref 136–145)
TRIGL SERPL-MCNC: 105 MG/DL (ref 0–150)
VLDLC SERPL-MCNC: 19 MG/DL (ref 5–40)
WBC NRBC COR # BLD AUTO: 5.98 10*3/MM3 (ref 3.4–10.8)

## 2024-04-15 PROCEDURE — 85027 COMPLETE CBC AUTOMATED: CPT

## 2024-04-15 PROCEDURE — 36415 COLL VENOUS BLD VENIPUNCTURE: CPT

## 2024-04-15 PROCEDURE — 80053 COMPREHEN METABOLIC PANEL: CPT

## 2024-04-15 PROCEDURE — 80061 LIPID PANEL: CPT

## 2024-04-30 ENCOUNTER — OFFICE VISIT (OUTPATIENT)
Dept: CARDIOLOGY | Facility: CLINIC | Age: 69
End: 2024-04-30
Payer: COMMERCIAL

## 2024-04-30 VITALS
HEIGHT: 70 IN | BODY MASS INDEX: 27.35 KG/M2 | HEART RATE: 78 BPM | WEIGHT: 191 LBS | DIASTOLIC BLOOD PRESSURE: 72 MMHG | SYSTOLIC BLOOD PRESSURE: 143 MMHG

## 2024-04-30 DIAGNOSIS — E78.2 MIXED HYPERLIPIDEMIA: ICD-10-CM

## 2024-04-30 DIAGNOSIS — I10 ESSENTIAL HYPERTENSION: ICD-10-CM

## 2024-04-30 DIAGNOSIS — I25.10 CORONARY ARTERY DISEASE INVOLVING NATIVE CORONARY ARTERY OF NATIVE HEART WITHOUT ANGINA PECTORIS: Primary | ICD-10-CM

## 2024-04-30 PROCEDURE — 99214 OFFICE O/P EST MOD 30 MIN: CPT | Performed by: INTERNAL MEDICINE

## 2024-04-30 RX ORDER — ROSUVASTATIN CALCIUM 40 MG/1
40 TABLET, COATED ORAL
Qty: 90 TABLET | Refills: 3 | Status: SHIPPED | OUTPATIENT
Start: 2024-04-30

## 2024-04-30 RX ORDER — METOPROLOL SUCCINATE 100 MG/1
100 TABLET, EXTENDED RELEASE ORAL DAILY
Qty: 90 TABLET | Refills: 3 | Status: SHIPPED | OUTPATIENT
Start: 2024-04-30

## 2024-04-30 RX ORDER — LISINOPRIL 20 MG/1
20 TABLET ORAL DAILY
Qty: 90 TABLET | Refills: 3 | Status: SHIPPED | OUTPATIENT
Start: 2024-04-30

## 2024-04-30 NOTE — ASSESSMENT & PLAN NOTE
Most recent LDL is 63, which is at goal.  Liver enzymes are within normal limits.  Continue rosuvastatin 40 mg nightly.  We will repeat lipid panel before next visit.

## 2024-04-30 NOTE — ASSESSMENT & PLAN NOTE
He is stable with no angina.  LV systolic function is preserved.  Continue aspirin, statin beta-blocker at the current dose.

## 2024-04-30 NOTE — PROGRESS NOTES
CARDIOLOGY FOLLOW-UP PROGRESS NOTE        Chief Complaint  Follow-up, Coronary Artery Disease, Hypertension, and Hyperlipidemia    Subjective            Myles Gann presents to [facility] CARDIOLOGY  History of Present Illness    Mr Gann is here for annual follow-up visit for coronary disease and hyperlipidemia.  He denies any recent episodes of chest pain, shortness of breath, palpitations or dizziness.   Past History:    (1) Premature coronary artery disease. Cardiac catheterization on 11/08/2004 at [facility] in [place], [place], with 90% stenosis involving the proximal left anterior descending artery in the setting of acute coronary syndrome. He underwent 3.0 Cypher stent placement. Other arteries were normal. (2) Hypertension. (3) Hyperlipidemia.     Medical History:  Past Medical History:   Diagnosis Date    Foot pain, left     Foot pain, right     Heart attack     Hyperlipidemia     Hypertension     Plantar fascial fibromatosis of both feet        Surgical History: has a past surgical history that includes Cardiac surgery; Throat surgery; Other surgical history; and Colonoscopy (N/A, 1/5/2022).     Family History: family history includes Diabetes in his brother and mother; Heart disease in his brother.     Social History: reports that he has never smoked. He has never been exposed to tobacco smoke. He has never used smokeless tobacco. He reports current alcohol use. He reports that he does not use drugs.    Allergies: Patient has no known allergies.    Current Outpatient Medications on File Prior to Visit   Medication Sig    aspirin 325 MG tablet Take 1 tablet by mouth.    triamcinolone (KENALOG) 0.1 % ointment Apply 1 application  topically to the appropriate area as directed 2 (Two) Times a Day.    vitamin D (ERGOCALCIFEROL) 1.25 MG (29084 UT) capsule capsule Take 1 capsule by mouth Every 7 (Seven) Days.      [DISCONTINUED] lisinopril (PRINIVIL,ZESTRIL) 20 MG  "tablet Take 1 tablet by mouth Daily.    [DISCONTINUED] metoprolol succinate XL (TOPROL-XL) 100 MG 24 hr tablet Take 1 tablet by mouth Daily.    [DISCONTINUED] predniSONE (DELTASONE) 20 MG tablet Take 1 tablet by mouth 2 (Two) Times a Day.    [DISCONTINUED] rosuvastatin (CRESTOR) 40 MG tablet Take 1 tablet by mouth every night at bedtime.     No current facility-administered medications on file prior to visit.          Review of Systems   Respiratory:  Negative for cough, shortness of breath and wheezing.    Cardiovascular:  Negative for chest pain, palpitations and leg swelling.   Gastrointestinal:  Negative for nausea and vomiting.   Neurological:  Negative for dizziness and syncope.        Objective     /72   Pulse 78   Ht 177.8 cm (70\")   Wt 86.6 kg (191 lb)   BMI 27.41 kg/m²       Physical Exam    General : Alert, awake, no acute distress  Neck : Supple, no carotid bruit, no jugular venous distention  CVS : Regular rate and rhythm, no murmur, rubs or gallops  Lungs: Clear to auscultation bilaterally, no crackles or rhonchi  Abdomen: Soft, nontender, bowel sounds heard in all 4 quadrants  Extremities: Warm, well-perfused, no pedal edema    Result Review :     The following data was reviewed by: Everett Gallagher MD on 04/30/2024:    CMP          4/15/2024    06:36   CMP   Glucose 150    BUN 16    Creatinine 1.24    EGFR 62.9    Sodium 137    Potassium 4.4    Chloride 103    Calcium 9.8    Total Protein 7.1    Albumin 4.5    Globulin 2.6    Total Bilirubin 0.6    Alkaline Phosphatase 52    AST (SGOT) 24    ALT (SGPT) 29    Albumin/Globulin Ratio 1.7    BUN/Creatinine Ratio 12.9    Anion Gap 7.0      CBC          6/13/2023    14:56 4/15/2024    06:36   CBC   WBC 6.13  5.98    RBC 4.78  5.20    Hemoglobin 13.9  14.9    Hematocrit 40.6  44.9    MCV 84.9  86.3    MCH 29.1  28.7    MCHC 34.2  33.2    RDW 12.9  12.8    Platelets 203  231      TSH          6/13/2023    14:56   TSH   TSH 2.620      Lipid Panel  "         4/15/2024    06:36   Lipid Panel   Total Cholesterol 127    Triglycerides 105    HDL Cholesterol 45    VLDL Cholesterol 19    LDL Cholesterol  63    LDL/HDL Ratio 1.36           Data reviewed: Cardiology studies      Echocardiogram done on 8/10/2020 showed    1. Normal left ventricular systolic function with estimated LV ejection fraction of 55%.   2. Mild mitral regurgitation.                      Assessment and Plan        Diagnoses and all orders for this visit:    1. Coronary artery disease involving native coronary artery of native heart without angina pectoris (Primary)  Assessment & Plan:  He is stable with no angina.  LV systolic function is preserved.  Continue aspirin, statin beta-blocker at the current dose.    Orders:  -     metoprolol succinate XL (TOPROL-XL) 100 MG 24 hr tablet; Take 1 tablet by mouth Daily.  Dispense: 90 tablet; Refill: 3  -     rosuvastatin (CRESTOR) 40 MG tablet; Take 1 tablet by mouth every night at bedtime.  Dispense: 90 tablet; Refill: 3  -     CBC (No Diff); Future    2. Essential hypertension  Assessment & Plan:  Blood pressure well-controlled.  Continue lisinopril, metoprolol at the current dose.    Orders:  -     lisinopril (PRINIVIL,ZESTRIL) 20 MG tablet; Take 1 tablet by mouth Daily.  Dispense: 90 tablet; Refill: 3  -     metoprolol succinate XL (TOPROL-XL) 100 MG 24 hr tablet; Take 1 tablet by mouth Daily.  Dispense: 90 tablet; Refill: 3    3. Mixed hyperlipidemia  Assessment & Plan:  Most recent LDL is 63, which is at goal.  Liver enzymes are within normal limits.  Continue rosuvastatin 40 mg nightly.  We will repeat lipid panel before next visit.    Orders:  -     rosuvastatin (CRESTOR) 40 MG tablet; Take 1 tablet by mouth every night at bedtime.  Dispense: 90 tablet; Refill: 3  -     Comprehensive Metabolic Panel; Future  -     Lipid Panel; Future              Follow Up     Return in about 1 year (around 4/30/2025) for Next scheduled follow up.    Patient was  given instructions and counseling regarding his condition or for health maintenance advice. Please see specific information pulled into the AVS if appropriate.

## 2024-06-13 ENCOUNTER — LAB (OUTPATIENT)
Dept: LAB | Facility: HOSPITAL | Age: 69
End: 2024-06-13
Payer: COMMERCIAL

## 2024-06-13 ENCOUNTER — OFFICE VISIT (OUTPATIENT)
Dept: FAMILY MEDICINE CLINIC | Facility: CLINIC | Age: 69
End: 2024-06-13
Payer: COMMERCIAL

## 2024-06-13 VITALS
TEMPERATURE: 96.5 F | HEIGHT: 70 IN | DIASTOLIC BLOOD PRESSURE: 72 MMHG | OXYGEN SATURATION: 98 % | WEIGHT: 191.9 LBS | BODY MASS INDEX: 27.47 KG/M2 | SYSTOLIC BLOOD PRESSURE: 121 MMHG | HEART RATE: 85 BPM

## 2024-06-13 DIAGNOSIS — Z12.5 SCREENING FOR PROSTATE CANCER: ICD-10-CM

## 2024-06-13 DIAGNOSIS — Z11.59 NEED FOR HEPATITIS C SCREENING TEST: ICD-10-CM

## 2024-06-13 DIAGNOSIS — I10 ESSENTIAL HYPERTENSION: ICD-10-CM

## 2024-06-13 DIAGNOSIS — L24.9 IRRITANT CONTACT DERMATITIS, UNSPECIFIED TRIGGER: ICD-10-CM

## 2024-06-13 DIAGNOSIS — R73.01 IMPAIRED FASTING BLOOD SUGAR: ICD-10-CM

## 2024-06-13 DIAGNOSIS — Z23 NEED FOR PNEUMOCOCCAL 20-VALENT CONJUGATE VACCINATION: ICD-10-CM

## 2024-06-13 DIAGNOSIS — Z00.00 ANNUAL PHYSICAL EXAM: ICD-10-CM

## 2024-06-13 DIAGNOSIS — Z00.00 ANNUAL PHYSICAL EXAM: Primary | ICD-10-CM

## 2024-06-13 LAB
CHOLEST SERPL-MCNC: 103 MG/DL (ref 0–200)
HBA1C MFR BLD: 6.8 % (ref 4.8–5.6)
HCV AB SER QL: NORMAL
HDLC SERPL-MCNC: 37 MG/DL (ref 40–60)
LDLC SERPL CALC-MCNC: 38 MG/DL (ref 0–100)
LDLC/HDLC SERPL: 0.89 {RATIO}
PSA SERPL-MCNC: 0.54 NG/ML (ref 0–4)
TRIGL SERPL-MCNC: 166 MG/DL (ref 0–150)
TSH SERPL DL<=0.05 MIU/L-ACNC: 2.51 UIU/ML (ref 0.27–4.2)
VLDLC SERPL-MCNC: 28 MG/DL (ref 5–40)

## 2024-06-13 PROCEDURE — 83036 HEMOGLOBIN GLYCOSYLATED A1C: CPT

## 2024-06-13 PROCEDURE — 80061 LIPID PANEL: CPT

## 2024-06-13 PROCEDURE — 99397 PER PM REEVAL EST PAT 65+ YR: CPT | Performed by: STUDENT IN AN ORGANIZED HEALTH CARE EDUCATION/TRAINING PROGRAM

## 2024-06-13 PROCEDURE — 36415 COLL VENOUS BLD VENIPUNCTURE: CPT

## 2024-06-13 PROCEDURE — 90471 IMMUNIZATION ADMIN: CPT | Performed by: STUDENT IN AN ORGANIZED HEALTH CARE EDUCATION/TRAINING PROGRAM

## 2024-06-13 PROCEDURE — 84443 ASSAY THYROID STIM HORMONE: CPT

## 2024-06-13 PROCEDURE — 82570 ASSAY OF URINE CREATININE: CPT | Performed by: STUDENT IN AN ORGANIZED HEALTH CARE EDUCATION/TRAINING PROGRAM

## 2024-06-13 PROCEDURE — 82043 UR ALBUMIN QUANTITATIVE: CPT | Performed by: STUDENT IN AN ORGANIZED HEALTH CARE EDUCATION/TRAINING PROGRAM

## 2024-06-13 PROCEDURE — 90677 PCV20 VACCINE IM: CPT | Performed by: STUDENT IN AN ORGANIZED HEALTH CARE EDUCATION/TRAINING PROGRAM

## 2024-06-13 PROCEDURE — G0103 PSA SCREENING: HCPCS

## 2024-06-13 PROCEDURE — 86803 HEPATITIS C AB TEST: CPT

## 2024-06-13 RX ORDER — TRIAMCINOLONE ACETONIDE 1 MG/G
1 OINTMENT TOPICAL 2 TIMES DAILY
Qty: 80 G | Refills: 1 | Status: SHIPPED | OUTPATIENT
Start: 2024-06-13

## 2024-06-13 NOTE — PROGRESS NOTES
"Chief Complaint    Annual physical    Subjective         Myles Gann is a 69 y.o. male who presents to [facility]    69 years old comes to the clinic today for annual physical.    Following up with cardiologist for coronary artery disease.    Patient is compliant with aspirin/metoprolol/Crestor.    Had recent blood work and blood sugar was around 150.  Last A1c about a year ago was 6.4.  Patient is worried about diabetes as he has strong family history of diabetes.  Patient does report healthy lifestyle, does not drink any alcohol anymore.  Patient used to drink good amount of alcohol in the past but not since last 2 to 3 years.    Hypertension is controlled on current medication.    Physically active without any difficulties, 12+ review of systems are unremarkable otherwise.    Objective   Vital Signs:   Vitals:    06/13/24 1544   BP: 121/72   Pulse: 85   Temp: 96.5 °F (35.8 °C)   SpO2: 98%   Weight: 87 kg (191 lb 14.4 oz)   Height: 177.8 cm (70\")      Body mass index is 27.53 kg/m².   Wt Readings from Last 3 Encounters:   06/13/24 87 kg (191 lb 14.4 oz)   04/30/24 86.6 kg (191 lb)   08/23/23 85.8 kg (189 lb 1.6 oz)      BP Readings from Last 3 Encounters:   06/13/24 121/72   04/30/24 143/72   08/23/23 136/62        Patient Care Team:  Isaac Goodman MD as PCP - General (Family Medicine)  Curry April, APRN as Nurse Practitioner (Nurse Practitioner)     Physical Exam  Vitals reviewed.   Constitutional:       Appearance: Normal appearance. He is well-developed.   HENT:      Head: Normocephalic and atraumatic.      Right Ear: External ear normal.      Left Ear: External ear normal.      Mouth/Throat:      Pharynx: No oropharyngeal exudate.   Eyes:      Conjunctiva/sclera: Conjunctivae normal.      Pupils: Pupils are equal, round, and reactive to light.   Cardiovascular:      Rate and Rhythm: Normal rate and regular rhythm.      Heart sounds: No murmur heard.     No friction rub. " No gallop.   Pulmonary:      Effort: Pulmonary effort is normal.      Breath sounds: Normal breath sounds. No wheezing or rhonchi.   Abdominal:      General: Bowel sounds are normal. There is no distension.      Palpations: Abdomen is soft.      Tenderness: There is no abdominal tenderness.   Skin:     General: Skin is warm and dry.   Neurological:      Mental Status: He is alert and oriented to person, place, and time.      Cranial Nerves: No cranial nerve deficit.   Psychiatric:         Mood and Affect: Mood and affect normal.         Behavior: Behavior normal.         Thought Content: Thought content normal.         Judgment: Judgment normal.        BMI is >= 25 and <30. (Overweight) The following options were offered after discussion;: weight loss educational material (shared in after visit summary), exercise counseling/recommendations, and nutrition counseling/recommendations          Assessment and Plan   Diagnoses and all orders for this visit:    1. Annual physical exam (Primary)  Comments:  Daily exercise and healthy diet recommended  Orders:  -     TSH Rfx On Abnormal To Free T4; Future  -     Hemoglobin A1c; Future  -     Lipid Panel; Future  -     Microalbumin / Creatinine Urine Ratio - Urine, Clean Catch    2. Impaired fasting blood sugar  -     TSH Rfx On Abnormal To Free T4; Future  -     Hemoglobin A1c; Future  -     Lipid Panel; Future  -     Microalbumin / Creatinine Urine Ratio - Urine, Clean Catch    3. Essential hypertension  Comments:  Chronic, controlled.  Orders:  -     TSH Rfx On Abnormal To Free T4; Future  -     Hemoglobin A1c; Future  -     Lipid Panel; Future  -     Microalbumin / Creatinine Urine Ratio - Urine, Clean Catch    4. Screening for prostate cancer  -     TSH Rfx On Abnormal To Free T4; Future  -     Hemoglobin A1c; Future  -     Lipid Panel; Future  -     Microalbumin / Creatinine Urine Ratio - Urine, Clean Catch  -     PSA SCREENING; Future    5. Need for hepatitis C  screening test  -     Hepatitis C Antibody; Future    6. Need for pneumococcal 20-valent conjugate vaccination  -     Pneumococcal Conjugate Vaccine 20-Valent (PCV20)    7. Irritant contact dermatitis, unspecified trigger  -     triamcinolone (KENALOG) 0.1 % ointment; Apply 1 Application topically to the appropriate area as directed 2 (Two) Times a Day.  Dispense: 80 g; Refill: 1          Tobacco Use: Low Risk  (6/13/2024)    Patient History    • Smoking Tobacco Use: Never    • Smokeless Tobacco Use: Never    • Passive Exposure: Never            Follow Up   Return in about 6 months (around 12/13/2024) for Next scheduled follow up.  Patient was given instructions and counseling regarding his condition or for health maintenance advice. Please see specific information pulled into the AVS if appropriate.

## 2024-06-14 DIAGNOSIS — E55.9 VITAMIN D INSUFFICIENCY: ICD-10-CM

## 2024-06-14 DIAGNOSIS — E11.9 TYPE 2 DIABETES MELLITUS WITHOUT COMPLICATION, WITHOUT LONG-TERM CURRENT USE OF INSULIN: Primary | ICD-10-CM

## 2024-06-14 LAB
ALBUMIN UR-MCNC: <1.2 MG/DL
CREAT UR-MCNC: 87.8 MG/DL
MICROALBUMIN/CREAT UR: NORMAL MG/G{CREAT}

## 2024-06-14 RX ORDER — DAPAGLIFLOZIN AND METFORMIN HYDROCHLORIDE 5; 500 MG/1; MG/1
1 TABLET, FILM COATED, EXTENDED RELEASE ORAL DAILY
Qty: 90 TABLET | Refills: 1 | Status: SHIPPED | OUTPATIENT
Start: 2024-06-14

## 2024-06-14 RX ORDER — ERGOCALCIFEROL 1.25 MG/1
50000 CAPSULE ORAL
Qty: 12 CAPSULE | Refills: 5 | Status: SHIPPED | OUTPATIENT
Start: 2024-06-14

## 2024-09-10 ENCOUNTER — TRANSCRIBE ORDERS (OUTPATIENT)
Dept: ADMINISTRATIVE | Facility: HOSPITAL | Age: 69
End: 2024-09-10
Payer: COMMERCIAL

## 2024-09-10 DIAGNOSIS — G45.3 AMAUROSIS FUGAX: Primary | ICD-10-CM

## 2024-10-14 ENCOUNTER — HOSPITAL ENCOUNTER (OUTPATIENT)
Dept: MRI IMAGING | Facility: HOSPITAL | Age: 69
Discharge: HOME | End: 2024-10-14
Payer: COMMERCIAL

## 2024-10-14 ENCOUNTER — HOSPITAL ENCOUNTER (OUTPATIENT)
Dept: CARDIOLOGY | Facility: HOSPITAL | Age: 69
Discharge: HOME | End: 2024-10-14
Payer: COMMERCIAL

## 2024-10-14 DIAGNOSIS — G45.3 AMAUROSIS FUGAX: ICD-10-CM

## 2024-10-14 LAB
BH CV XLRA MEAS LEFT CAROTID BULB EDV: 19.3 CM/SEC
BH CV XLRA MEAS LEFT CAROTID BULB PSV: 60.6 CM/SEC
BH CV XLRA MEAS LEFT DIST CCA EDV: 17 CM/SEC
BH CV XLRA MEAS LEFT DIST CCA PSV: 68.6 CM/SEC
BH CV XLRA MEAS LEFT DIST ICA EDV: 20.5 CM/SEC
BH CV XLRA MEAS LEFT DIST ICA PSV: 49 CM/SEC
BH CV XLRA MEAS LEFT ICA/CCA RATIO: 0.8
BH CV XLRA MEAS LEFT MID ICA EDV: 25 CM/SEC
BH CV XLRA MEAS LEFT MID ICA PSV: 70.2 CM/SEC
BH CV XLRA MEAS LEFT PROX CCA EDV: 18.2 CM/SEC
BH CV XLRA MEAS LEFT PROX CCA PSV: 78.4 CM/SEC
BH CV XLRA MEAS LEFT PROX ECA EDV: 14.3 CM/SEC
BH CV XLRA MEAS LEFT PROX ECA PSV: 91.4 CM/SEC
BH CV XLRA MEAS LEFT PROX ICA EDV: 16.2 CM/SEC
BH CV XLRA MEAS LEFT PROX ICA PSV: 53.2 CM/SEC
BH CV XLRA MEAS LEFT VERTEBRAL A EDV: 16.9 CM/SEC
BH CV XLRA MEAS LEFT VERTEBRAL A PSV: 57.7 CM/SEC
BH CV XLRA MEAS RIGHT CAROTID BULB EDV: 17.7 CM/SEC
BH CV XLRA MEAS RIGHT CAROTID BULB PSV: 65.6 CM/SEC
BH CV XLRA MEAS RIGHT DIST CCA EDV: 19.1 CM/SEC
BH CV XLRA MEAS RIGHT DIST CCA PSV: 84 CM/SEC
BH CV XLRA MEAS RIGHT DIST ICA EDV: 15.6 CM/SEC
BH CV XLRA MEAS RIGHT DIST ICA PSV: 51.2 CM/SEC
BH CV XLRA MEAS RIGHT ICA/CCA RATIO: 0.9
BH CV XLRA MEAS RIGHT MID ICA EDV: 16.3 CM/SEC
BH CV XLRA MEAS RIGHT MID ICA PSV: 46.9 CM/SEC
BH CV XLRA MEAS RIGHT PROX CCA EDV: 14.3 CM/SEC
BH CV XLRA MEAS RIGHT PROX CCA PSV: 74.1 CM/SEC
BH CV XLRA MEAS RIGHT PROX ECA EDV: 12.3 CM/SEC
BH CV XLRA MEAS RIGHT PROX ECA PSV: 83 CM/SEC
BH CV XLRA MEAS RIGHT PROX ICA EDV: 17.1 CM/SEC
BH CV XLRA MEAS RIGHT PROX ICA PSV: 72.7 CM/SEC
BH CV XLRA MEAS RIGHT VERTEBRAL A EDV: 9.7 CM/SEC
BH CV XLRA MEAS RIGHT VERTEBRAL A PSV: 43.4 CM/SEC
LEFT ARM BP: NORMAL MMHG
RIGHT ARM BP: NORMAL MMHG

## 2024-10-14 PROCEDURE — 93880 EXTRACRANIAL BILAT STUDY: CPT | Performed by: SURGERY

## 2024-10-14 PROCEDURE — 93880 EXTRACRANIAL BILAT STUDY: CPT

## 2024-11-09 DIAGNOSIS — E11.9 TYPE 2 DIABETES MELLITUS WITHOUT COMPLICATION, WITHOUT LONG-TERM CURRENT USE OF INSULIN: ICD-10-CM

## 2024-11-11 RX ORDER — DAPAGLIFLOZIN AND METFORMIN HYDROCHLORIDE 5; 500 MG/1; MG/1
1 TABLET, FILM COATED, EXTENDED RELEASE ORAL DAILY
Qty: 90 TABLET | Refills: 1 | Status: SHIPPED | OUTPATIENT
Start: 2024-11-11

## 2024-11-11 NOTE — TELEPHONE ENCOUNTER
UPCOMING APPTS  With Family Medicine (Isaac Goodman MD)  12/17/2024 at 4:15 PM  LAST OFFICE VISIT - THIS DEPT  6/13/2024 Isaac Goodman MD

## 2024-12-16 ENCOUNTER — TELEPHONE (OUTPATIENT)
Dept: FAMILY MEDICINE CLINIC | Facility: CLINIC | Age: 69
End: 2024-12-16
Payer: COMMERCIAL

## 2024-12-17 ENCOUNTER — OFFICE VISIT (OUTPATIENT)
Dept: FAMILY MEDICINE CLINIC | Facility: CLINIC | Age: 69
End: 2024-12-17
Payer: COMMERCIAL

## 2024-12-17 VITALS
HEART RATE: 81 BPM | OXYGEN SATURATION: 97 % | SYSTOLIC BLOOD PRESSURE: 134 MMHG | TEMPERATURE: 96 F | BODY MASS INDEX: 26.63 KG/M2 | WEIGHT: 186 LBS | DIASTOLIC BLOOD PRESSURE: 66 MMHG | RESPIRATION RATE: 16 BRPM | HEIGHT: 70 IN

## 2024-12-17 DIAGNOSIS — E11.9 TYPE 2 DIABETES MELLITUS WITHOUT COMPLICATION, WITHOUT LONG-TERM CURRENT USE OF INSULIN: Primary | ICD-10-CM

## 2024-12-17 DIAGNOSIS — I10 ESSENTIAL HYPERTENSION: ICD-10-CM

## 2024-12-17 LAB
ALBUMIN UR-MCNC: <1.2 MG/DL
CREAT UR-MCNC: 111.6 MG/DL
EXPIRATION DATE: ABNORMAL
HBA1C MFR BLD: 6.3 % (ref 4.5–5.7)
Lab: ABNORMAL
MICROALBUMIN/CREAT UR: NORMAL MG/G{CREAT}

## 2024-12-17 PROCEDURE — 82570 ASSAY OF URINE CREATININE: CPT | Performed by: STUDENT IN AN ORGANIZED HEALTH CARE EDUCATION/TRAINING PROGRAM

## 2024-12-17 PROCEDURE — 82043 UR ALBUMIN QUANTITATIVE: CPT | Performed by: STUDENT IN AN ORGANIZED HEALTH CARE EDUCATION/TRAINING PROGRAM

## 2024-12-17 PROCEDURE — 99214 OFFICE O/P EST MOD 30 MIN: CPT | Performed by: STUDENT IN AN ORGANIZED HEALTH CARE EDUCATION/TRAINING PROGRAM

## 2024-12-17 PROCEDURE — 83036 HEMOGLOBIN GLYCOSYLATED A1C: CPT | Performed by: STUDENT IN AN ORGANIZED HEALTH CARE EDUCATION/TRAINING PROGRAM

## 2024-12-17 RX ORDER — DAPAGLIFLOZIN AND METFORMIN HYDROCHLORIDE 5; 500 MG/1; MG/1
1 TABLET, FILM COATED, EXTENDED RELEASE ORAL DAILY
Qty: 90 TABLET | Refills: 1 | Status: SHIPPED | OUTPATIENT
Start: 2024-12-17

## 2024-12-17 RX ORDER — NYSTATIN AND TRIAMCINOLONE ACETONIDE 100000; 1 [USP'U]/G; MG/G
1 OINTMENT TOPICAL 2 TIMES DAILY
Qty: 30 G | Refills: 0 | Status: SHIPPED | OUTPATIENT
Start: 2024-12-17

## 2024-12-17 NOTE — PROGRESS NOTES
"Chief Complaint  Hypertension and Hyperlipidemia    Subjective         Myles Gann is a 69 y.o. male who presents to [facility]    69 years old comes to the clinic today to follow-up on medication/diabetes and hypertension.    Hypertension is controlled on lisinopril.    Diabetes type 2; compliant with Xigduo and due for blood work.    Patient is compliant with Crestor/aspirin.    Physically active without any chest pain or shortness of breath on exertion.    Objective   Vital Signs:   Vitals:    12/17/24 1551   BP: 134/66   BP Location: Right arm   Patient Position: Sitting   Cuff Size: Adult   Pulse: 81   Resp: 16   Temp: 96 °F (35.6 °C)   TempSrc: Temporal   SpO2: 97%   Weight: 84.4 kg (186 lb)   Height: 177.8 cm (70\")   PainSc: 0-No pain      Body mass index is 26.69 kg/m².   Wt Readings from Last 3 Encounters:   12/17/24 84.4 kg (186 lb)   06/13/24 87 kg (191 lb 14.4 oz)   04/30/24 86.6 kg (191 lb)      BP Readings from Last 3 Encounters:   12/17/24 134/66   06/13/24 121/72   04/30/24 143/72        Patient Care Team:  Isaac Goodman MD as PCP - General (Family Medicine)  Curry April, APRN as Nurse Practitioner (Nurse Practitioner)     Physical Exam  Vitals reviewed.   Constitutional:       Appearance: Normal appearance. He is well-developed.   HENT:      Head: Normocephalic and atraumatic.      Right Ear: External ear normal.      Left Ear: External ear normal.      Mouth/Throat:      Pharynx: No oropharyngeal exudate.   Eyes:      Conjunctiva/sclera: Conjunctivae normal.      Pupils: Pupils are equal, round, and reactive to light.   Cardiovascular:      Rate and Rhythm: Normal rate and regular rhythm.      Heart sounds: No murmur heard.     No friction rub. No gallop.   Pulmonary:      Effort: Pulmonary effort is normal.      Breath sounds: Normal breath sounds. No wheezing or rhonchi.   Abdominal:      General: Bowel sounds are normal. There is no distension.      " Palpations: Abdomen is soft.      Tenderness: There is no abdominal tenderness.   Skin:     General: Skin is warm and dry.   Neurological:      Mental Status: He is alert and oriented to person, place, and time.      Cranial Nerves: No cranial nerve deficit.   Psychiatric:         Mood and Affect: Mood and affect normal.         Behavior: Behavior normal.         Thought Content: Thought content normal.         Judgment: Judgment normal.                            Assessment and Plan   Diagnoses and all orders for this visit:    1. Type 2 diabetes mellitus without complication, without long-term current use of insulin (Primary)  Comments:  Chronic, controlled, new A1c needed.  Continue with Xigduo  Orders:  -     Microalbumin / Creatinine Urine Ratio - Urine, Clean Catch  -     POC Glycosylated Hemoglobin (Hb A1C)  -     TSH Rfx On Abnormal To Free T4; Future  -     CBC & Differential; Future  -     Comprehensive Metabolic Panel; Future  -     Hemoglobin A1c; Future  -     Lipid Panel; Future  -     Microalbumin / Creatinine Urine Ratio - Urine, Clean Catch; Future  -     nystatin-triamcinolone (MYCOLOG) 626787-4.1 UNIT/GM-% ointment; Apply 1 Application topically to the appropriate area as directed 2 (Two) Times a Day.  Dispense: 30 g; Refill: 0  -     dapagliflozin-metformin HCl ER (Xigduo XR) 5-500 MG tablet; Take 1 tablet by mouth Daily.  Dispense: 90 tablet; Refill: 1    2. Essential hypertension  Comments:  Chronic, controlled on lisinopril 20 mg, DASH diet recommended.      A1c today in the office is 6.3    Patient does not want to do any blood work at lab at this time.    Tobacco Use: Low Risk  (12/17/2024)    Patient History     Smoking Tobacco Use: Never     Smokeless Tobacco Use: Never     Passive Exposure: Never            Follow Up   Return in about 6 months (around 6/17/2025) for Next scheduled follow up.  Patient was given instructions and counseling regarding his condition or for health maintenance  advice. Please see specific information pulled into the AVS if appropriate.

## 2025-05-16 ENCOUNTER — LAB (OUTPATIENT)
Dept: LAB | Facility: HOSPITAL | Age: 70
End: 2025-05-16
Payer: COMMERCIAL

## 2025-05-16 DIAGNOSIS — E11.9 TYPE 2 DIABETES MELLITUS WITHOUT COMPLICATION, WITHOUT LONG-TERM CURRENT USE OF INSULIN: ICD-10-CM

## 2025-05-16 LAB
ALBUMIN SERPL-MCNC: 4.7 G/DL (ref 3.5–5.2)
ALBUMIN UR-MCNC: 1.6 MG/DL
ALBUMIN/GLOB SERPL: 1.7 G/DL
ALP SERPL-CCNC: 55 U/L (ref 39–117)
ALT SERPL W P-5'-P-CCNC: 17 U/L (ref 1–41)
ANION GAP SERPL CALCULATED.3IONS-SCNC: 9.8 MMOL/L (ref 5–15)
AST SERPL-CCNC: 24 U/L (ref 1–40)
BASOPHILS # BLD AUTO: 0.03 10*3/MM3 (ref 0–0.2)
BASOPHILS NFR BLD AUTO: 0.5 % (ref 0–1.5)
BILIRUB SERPL-MCNC: 0.7 MG/DL (ref 0–1.2)
BUN SERPL-MCNC: 24 MG/DL (ref 8–23)
BUN/CREAT SERPL: 20.3 (ref 7–25)
CALCIUM SPEC-SCNC: 9.7 MG/DL (ref 8.6–10.5)
CHLORIDE SERPL-SCNC: 101 MMOL/L (ref 98–107)
CHOLEST SERPL-MCNC: 111 MG/DL (ref 0–200)
CO2 SERPL-SCNC: 25.2 MMOL/L (ref 22–29)
CREAT SERPL-MCNC: 1.18 MG/DL (ref 0.76–1.27)
CREAT UR-MCNC: 158.5 MG/DL
DEPRECATED RDW RBC AUTO: 41 FL (ref 37–54)
EGFRCR SERPLBLD CKD-EPI 2021: 66.4 ML/MIN/1.73
EOSINOPHIL # BLD AUTO: 0.3 10*3/MM3 (ref 0–0.4)
EOSINOPHIL NFR BLD AUTO: 4.8 % (ref 0.3–6.2)
ERYTHROCYTE [DISTWIDTH] IN BLOOD BY AUTOMATED COUNT: 13.1 % (ref 12.3–15.4)
GLOBULIN UR ELPH-MCNC: 2.8 GM/DL
GLUCOSE SERPL-MCNC: 116 MG/DL (ref 65–99)
HBA1C MFR BLD: 6.5 % (ref 4.8–5.6)
HCT VFR BLD AUTO: 44.1 % (ref 37.5–51)
HDLC SERPL-MCNC: 40 MG/DL (ref 40–60)
HGB BLD-MCNC: 14.8 G/DL (ref 13–17.7)
IMM GRANULOCYTES # BLD AUTO: 0.01 10*3/MM3 (ref 0–0.05)
IMM GRANULOCYTES NFR BLD AUTO: 0.2 % (ref 0–0.5)
LDLC SERPL CALC-MCNC: 55 MG/DL (ref 0–100)
LDLC/HDLC SERPL: 1.4 {RATIO}
LYMPHOCYTES # BLD AUTO: 1.82 10*3/MM3 (ref 0.7–3.1)
LYMPHOCYTES NFR BLD AUTO: 29.3 % (ref 19.6–45.3)
MCH RBC QN AUTO: 29 PG (ref 26.6–33)
MCHC RBC AUTO-ENTMCNC: 33.6 G/DL (ref 31.5–35.7)
MCV RBC AUTO: 86.5 FL (ref 79–97)
MICROALBUMIN/CREAT UR: 10.1 MG/G (ref 0–29)
MONOCYTES # BLD AUTO: 0.57 10*3/MM3 (ref 0.1–0.9)
MONOCYTES NFR BLD AUTO: 9.2 % (ref 5–12)
NEUTROPHILS NFR BLD AUTO: 3.48 10*3/MM3 (ref 1.7–7)
NEUTROPHILS NFR BLD AUTO: 56 % (ref 42.7–76)
NRBC BLD AUTO-RTO: 0 /100 WBC (ref 0–0.2)
PLATELET # BLD AUTO: 208 10*3/MM3 (ref 140–450)
PMV BLD AUTO: 10.8 FL (ref 6–12)
POTASSIUM SERPL-SCNC: 4.5 MMOL/L (ref 3.5–5.2)
PROT SERPL-MCNC: 7.5 G/DL (ref 6–8.5)
RBC # BLD AUTO: 5.1 10*6/MM3 (ref 4.14–5.8)
SODIUM SERPL-SCNC: 136 MMOL/L (ref 136–145)
TRIGL SERPL-MCNC: 76 MG/DL (ref 0–150)
TSH SERPL DL<=0.05 MIU/L-ACNC: 3.17 UIU/ML (ref 0.27–4.2)
VLDLC SERPL-MCNC: 16 MG/DL (ref 5–40)
WBC NRBC COR # BLD AUTO: 6.21 10*3/MM3 (ref 3.4–10.8)

## 2025-05-16 PROCEDURE — 82570 ASSAY OF URINE CREATININE: CPT

## 2025-05-16 PROCEDURE — 36415 COLL VENOUS BLD VENIPUNCTURE: CPT

## 2025-05-16 PROCEDURE — 80061 LIPID PANEL: CPT

## 2025-05-16 PROCEDURE — 80050 GENERAL HEALTH PANEL: CPT

## 2025-05-16 PROCEDURE — 82043 UR ALBUMIN QUANTITATIVE: CPT

## 2025-05-16 PROCEDURE — 83036 HEMOGLOBIN GLYCOSYLATED A1C: CPT

## 2025-06-26 ENCOUNTER — OFFICE VISIT (OUTPATIENT)
Dept: CARDIOLOGY | Facility: CLINIC | Age: 70
End: 2025-06-26
Payer: COMMERCIAL

## 2025-06-26 VITALS
HEIGHT: 70 IN | DIASTOLIC BLOOD PRESSURE: 63 MMHG | WEIGHT: 187 LBS | SYSTOLIC BLOOD PRESSURE: 120 MMHG | HEART RATE: 77 BPM | BODY MASS INDEX: 26.77 KG/M2

## 2025-06-26 DIAGNOSIS — I10 ESSENTIAL HYPERTENSION: ICD-10-CM

## 2025-06-26 DIAGNOSIS — E78.2 MIXED HYPERLIPIDEMIA: ICD-10-CM

## 2025-06-26 DIAGNOSIS — I25.10 CORONARY ARTERY DISEASE INVOLVING NATIVE CORONARY ARTERY OF NATIVE HEART WITHOUT ANGINA PECTORIS: Primary | ICD-10-CM

## 2025-06-26 PROCEDURE — 99214 OFFICE O/P EST MOD 30 MIN: CPT | Performed by: INTERNAL MEDICINE

## 2025-06-26 RX ORDER — LISINOPRIL 20 MG/1
20 TABLET ORAL DAILY
Qty: 90 TABLET | Refills: 3 | Status: SHIPPED | OUTPATIENT
Start: 2025-06-26

## 2025-06-26 RX ORDER — METOPROLOL SUCCINATE 100 MG/1
100 TABLET, EXTENDED RELEASE ORAL DAILY
Qty: 90 TABLET | Refills: 3 | Status: SHIPPED | OUTPATIENT
Start: 2025-06-26

## 2025-06-26 RX ORDER — ROSUVASTATIN CALCIUM 40 MG/1
40 TABLET, COATED ORAL
Qty: 90 TABLET | Refills: 3 | Status: SHIPPED | OUTPATIENT
Start: 2025-06-26

## 2025-06-26 NOTE — PROGRESS NOTES
CARDIOLOGY FOLLOW-UP PROGRESS NOTE        Chief Complaint  Follow-up, Coronary Artery Disease, Hyperlipidemia, and Hypertension    Subjective            Myles Gann presents to [facility]  History of Present Illness    Mr. Gann is here for routine annual follow-up visit for coronary disease and hyperlipidemia.  Overall he feels fine.  He denies any recent episodes of chest pain, shortness of breath, palpitations or dizziness.    Past History:    (1) Premature coronary artery disease. Cardiac catheterization on 11/08/2004 at [facility] in [place], [place], with 90% stenosis involving the proximal left anterior descending artery in the setting of acute coronary syndrome. He underwent 3.0 Cypher stent placement. Other arteries were normal. (2) Hypertension. (3) Hyperlipidemia.     Medical History:  Past Medical History:   Diagnosis Date    Foot pain, left     Foot pain, right     Heart attack     Hyperlipidemia     Hypertension     Plantar fascial fibromatosis of both feet        Family History: family history includes Diabetes in his brother and mother; Heart disease in his brother.     Social History: reports that he has never smoked. He has never been exposed to tobacco smoke. He has never used smokeless tobacco. He reports current alcohol use. He reports that he does not use drugs.    Allergies: Patient has no known allergies.    Current Outpatient Medications on File Prior to Visit   Medication Sig    aspirin 325 MG tablet Take 1 tablet by mouth.    dapagliflozin-metformin HCl ER (Xigduo XR) 5-500 MG tablet Take 1 tablet by mouth Daily.    nystatin-triamcinolone (MYCOLOG) 973028-2.1 UNIT/GM-% ointment Apply 1 Application topically to the appropriate area as directed 2 (Two) Times a Day.    vitamin D (ERGOCALCIFEROL) 1.25 MG (63880 UT) capsule capsule Take 1 capsule by mouth Every 7 (Seven) Days.      lisinopril (PRINIVIL,ZESTRIL) 20 MG tablet Take 1  "tablet by mouth Daily.    metoprolol succinate XL (TOPROL-XL) 100 MG 24 hr tablet Take 1 tablet by mouth Daily.     rosuvastatin (CRESTOR) 40 MG tablet Take 1 tablet by mouth every night at bedtime.         Review of Systems   Respiratory:  Negative for cough, shortness of breath and wheezing.    Cardiovascular:  Negative for chest pain, palpitations and leg swelling.   Gastrointestinal:  Negative for nausea and vomiting.   Neurological:  Negative for dizziness and syncope.        Objective     /63   Pulse 77   Ht 177.8 cm (70\")   Wt 84.8 kg (187 lb)   BMI 26.83 kg/m²       Physical Exam    General : Alert, awake, no acute distress  Neck : Supple, no carotid bruit, no jugular venous distention  CVS : Regular rate and rhythm, no murmur, rubs or gallops  Lungs: Clear to auscultation bilaterally, no crackles or rhonchi  Abdomen: Soft, nontender, bowel sounds heard in all 4 quadrants  Extremities: Warm, well-perfused, no pedal edema    Result Review :     The following data was reviewed by: Everett Gallagher MD on 06/26/2025:    CMP          5/16/2025    06:36   CMP   Glucose 116    BUN 24    Creatinine 1.18    EGFR 66.4    Sodium 136    Potassium 4.5    Chloride 101    Calcium 9.7    Total Protein 7.5    Albumin 4.7    Globulin 2.8    Total Bilirubin 0.7    Alkaline Phosphatase 55    AST (SGOT) 24    ALT (SGPT) 17    Albumin/Globulin Ratio 1.7    BUN/Creatinine Ratio 20.3    Anion Gap 9.8      CBC          5/16/2025    06:36   CBC   WBC 6.21    RBC 5.10    Hemoglobin 14.8    Hematocrit 44.1    MCV 86.5    MCH 29.0    MCHC 33.6    RDW 13.1    Platelets 208      TSH          5/16/2025    06:36   TSH   TSH 3.170      Lipid Panel          5/16/2025    06:36   Lipid Panel   Total Cholesterol 111    Triglycerides 76    HDL Cholesterol 40    VLDL Cholesterol 16    LDL Cholesterol  55    LDL/HDL Ratio 1.40           Data reviewed: Cardiology studies      Echocardiogram done on 8/10/2020 showed    1. Normal left " ventricular systolic function with estimated LV ejection fraction of 55%.   2. Mild mitral regurgitation.              Assessment and Plan        Diagnoses and all orders for this visit:    1. Coronary artery disease involving native coronary artery of native heart without angina pectoris (Primary)  Assessment & Plan:  He is chest pain-free.  Continue aspirin, statin and beta-blocker.    Orders:  -     metoprolol succinate XL (TOPROL-XL) 100 MG 24 hr tablet; Take 1 tablet by mouth Daily.  Dispense: 90 tablet; Refill: 3  -     rosuvastatin (CRESTOR) 40 MG tablet; Take 1 tablet by mouth every night at bedtime.  Dispense: 90 tablet; Refill: 3    2. Essential hypertension  Assessment & Plan:  Hypertension is well-controlled.  Continue lisinopril, metoprolol.    Orders:  -     lisinopril (PRINIVIL,ZESTRIL) 20 MG tablet; Take 1 tablet by mouth Daily.  Dispense: 90 tablet; Refill: 3  -     metoprolol succinate XL (TOPROL-XL) 100 MG 24 hr tablet; Take 1 tablet by mouth Daily.  Dispense: 90 tablet; Refill: 3    3. Mixed hyperlipidemia  Assessment & Plan:  Most recent LDL is 55, at goal.  Continue rosuvastatin 40 mg nightly.    Orders:  -     rosuvastatin (CRESTOR) 40 MG tablet; Take 1 tablet by mouth every night at bedtime.  Dispense: 90 tablet; Refill: 3              Follow Up     Return in about 1 year (around 6/26/2026) for Next scheduled follow up.    Patient was given instructions and counseling regarding his condition or for health maintenance advice. Please see specific information pulled into the AVS if appropriate.

## 2025-07-17 ENCOUNTER — OFFICE VISIT (OUTPATIENT)
Dept: FAMILY MEDICINE CLINIC | Facility: CLINIC | Age: 70
End: 2025-07-17
Payer: COMMERCIAL

## 2025-07-17 VITALS
OXYGEN SATURATION: 98 % | RESPIRATION RATE: 16 BRPM | SYSTOLIC BLOOD PRESSURE: 122 MMHG | BODY MASS INDEX: 26.63 KG/M2 | TEMPERATURE: 96.5 F | HEIGHT: 70 IN | HEART RATE: 66 BPM | DIASTOLIC BLOOD PRESSURE: 58 MMHG | WEIGHT: 186 LBS

## 2025-07-17 DIAGNOSIS — E11.9 TYPE 2 DIABETES MELLITUS WITHOUT COMPLICATION, WITHOUT LONG-TERM CURRENT USE OF INSULIN: ICD-10-CM

## 2025-07-17 DIAGNOSIS — I10 ESSENTIAL HYPERTENSION: ICD-10-CM

## 2025-07-17 DIAGNOSIS — Z12.5 SCREENING FOR PROSTATE CANCER: ICD-10-CM

## 2025-07-17 DIAGNOSIS — E55.9 VITAMIN D INSUFFICIENCY: ICD-10-CM

## 2025-07-17 DIAGNOSIS — Z00.00 ANNUAL PHYSICAL EXAM: Primary | ICD-10-CM

## 2025-07-17 RX ORDER — ERGOCALCIFEROL 1.25 MG/1
50000 CAPSULE, LIQUID FILLED ORAL
Qty: 12 CAPSULE | Refills: 5 | Status: SHIPPED | OUTPATIENT
Start: 2025-07-17

## 2025-07-17 RX ORDER — DAPAGLIFLOZIN AND METFORMIN HYDROCHLORIDE 5; 500 MG/1; MG/1
1 TABLET, FILM COATED, EXTENDED RELEASE ORAL DAILY
Qty: 90 TABLET | Refills: 1 | Status: SHIPPED | OUTPATIENT
Start: 2025-07-17

## 2025-07-17 NOTE — PROGRESS NOTES
"Chief Complaint  Diabetes and annual physical    Subjective         Myles Gann is a 70 y.o. male who presents to [facility]    70 years old comes to the clinic today for annual physical and follow-up.    Recent blood work shows controlled diabetes on Xigduo.    Patient is compliant with cardiologist and lisinopril/aspirin/statin.    Physically active without any difficulties.    12+ review of systems are unremarkable, up-to-date with preventive care.    Objective   Vital Signs:   Vitals:    07/17/25 1549   BP: 122/58   Pulse: 66   Resp: 16   Temp: 96.5 °F (35.8 °C)   TempSrc: Temporal   SpO2: 98%   Weight: 84.4 kg (186 lb)   Height: 177.8 cm (70\")      Body mass index is 26.69 kg/m².   Wt Readings from Last 3 Encounters:   07/17/25 84.4 kg (186 lb)   06/26/25 84.8 kg (187 lb)   12/17/24 84.4 kg (186 lb)      BP Readings from Last 3 Encounters:   07/17/25 122/58   06/26/25 120/63   12/17/24 134/66        Patient Care Team:  Isaac Goodman MD as PCP - General (Family Medicine)  Curry, April, APRN as Nurse Practitioner (Nurse Practitioner)     Physical Exam  Vitals reviewed.   Constitutional:       Appearance: Normal appearance. He is well-developed.   HENT:      Head: Normocephalic and atraumatic.      Right Ear: External ear normal.      Left Ear: External ear normal.      Mouth/Throat:      Pharynx: No oropharyngeal exudate.   Eyes:      Conjunctiva/sclera: Conjunctivae normal.      Pupils: Pupils are equal, round, and reactive to light.   Cardiovascular:      Rate and Rhythm: Normal rate and regular rhythm.      Heart sounds: No murmur heard.     No friction rub. No gallop.   Pulmonary:      Effort: Pulmonary effort is normal.      Breath sounds: Normal breath sounds. No wheezing or rhonchi.   Abdominal:      General: Bowel sounds are normal. There is no distension.      Palpations: Abdomen is soft.      Tenderness: There is no abdominal tenderness.   Skin:     General: Skin " is warm and dry.   Neurological:      Mental Status: He is alert and oriented to person, place, and time.      Cranial Nerves: No cranial nerve deficit.   Psychiatric:         Mood and Affect: Mood and affect normal.         Behavior: Behavior normal.         Thought Content: Thought content normal.         Judgment: Judgment normal.            BMI is >= 25 and <30. (Overweight) The following options were offered after discussion;: weight loss educational material (shared in after visit summary) and exercise counseling/recommendations              Assessment and Plan   Diagnoses and all orders for this visit:    1. Annual physical exam (Primary)  Comments:  Lifestyle modifications discussed  Orders:  -     Hemoglobin A1c; Future  -     Comprehensive metabolic panel; Future  -     Microalbumin / Creatinine Urine Ratio - Urine, Clean Catch; Future    2. Type 2 diabetes mellitus without complication, without long-term current use of insulin  -     dapagliflozin-metformin HCl ER (Xigduo XR) 5-500 MG tablet; Take 1 tablet by mouth Daily.  Dispense: 90 tablet; Refill: 1  -     Hemoglobin A1c; Future  -     Comprehensive metabolic panel; Future  -     Microalbumin / Creatinine Urine Ratio - Urine, Clean Catch; Future    3. Essential hypertension  -     Hemoglobin A1c; Future  -     Comprehensive metabolic panel; Future  -     Microalbumin / Creatinine Urine Ratio - Urine, Clean Catch; Future    4. Type 2 diabetes mellitus without complication, without long-term current use of insulin  Comments:  Chronic, controlled,  recent A1c 6.5.  Continue with Xigduo  Orders:  -     dapagliflozin-metformin HCl ER (Xigduo XR) 5-500 MG tablet; Take 1 tablet by mouth Daily.  Dispense: 90 tablet; Refill: 1  -     Hemoglobin A1c; Future  -     Comprehensive metabolic panel; Future  -     Microalbumin / Creatinine Urine Ratio - Urine, Clean Catch; Future    5. Vitamin D insufficiency  -     vitamin D (ERGOCALCIFEROL) 1.25 MG (64338 UT)  capsule capsule; Take 1 capsule by mouth Every 7 (Seven) Days.    Dispense: 12 capsule; Refill: 5  -     Hemoglobin A1c; Future  -     Comprehensive metabolic panel; Future  -     Microalbumin / Creatinine Urine Ratio - Urine, Clean Catch; Future    6. Screening for prostate cancer  -     PSA SCREENING; Future          Tobacco Use: Low Risk  (7/17/2025)    Patient History     Smoking Tobacco Use: Never     Smokeless Tobacco Use: Never     Passive Exposure: Never            Follow Up   Return in about 6 months (around 1/17/2026) for Recheck.  Patient was given instructions and counseling regarding his condition or for health maintenance advice. Please see specific information pulled into the AVS if appropriate.

## (undated) DEVICE — COLON KIT: Brand: MEDLINE INDUSTRIES, INC.

## (undated) DEVICE — Device: Brand: DEFENDO AIR/WATER/SUCTION AND BIOPSY VALVE

## (undated) DEVICE — SOL IRRG H2O PL/BG 1000ML STRL